# Patient Record
Sex: FEMALE | Race: WHITE | Employment: FULL TIME | URBAN - METROPOLITAN AREA
[De-identification: names, ages, dates, MRNs, and addresses within clinical notes are randomized per-mention and may not be internally consistent; named-entity substitution may affect disease eponyms.]

---

## 2024-06-11 DIAGNOSIS — Z96.651 HX OF TOTAL KNEE ARTHROPLASTY, RIGHT: ICD-10-CM

## 2024-06-11 DIAGNOSIS — M17.11 PRIMARY OSTEOARTHRITIS OF RIGHT KNEE: ICD-10-CM

## 2024-06-12 DIAGNOSIS — M17.11 PRIMARY LOCALIZED OSTEOARTHRITIS OF RIGHT KNEE: ICD-10-CM

## 2024-06-12 DIAGNOSIS — M17.11 PRIMARY OSTEOARTHRITIS OF RIGHT KNEE: ICD-10-CM

## 2024-06-13 RX ORDER — CHLORHEXIDINE GLUCONATE ORAL RINSE 1.2 MG/ML
SOLUTION DENTAL
Qty: 120 ML | Refills: 0 | Status: SHIPPED | OUTPATIENT
Start: 2024-06-13

## 2024-07-05 ENCOUNTER — CLINICAL SUPPORT (OUTPATIENT)
Dept: PREADMISSION TESTING | Facility: HOSPITAL | Age: 64
End: 2024-07-05
Payer: COMMERCIAL

## 2024-07-05 DIAGNOSIS — M17.11 PRIMARY OSTEOARTHRITIS OF RIGHT KNEE: ICD-10-CM

## 2024-07-05 RX ORDER — AMITRIPTYLINE HYDROCHLORIDE 25 MG/1
1 TABLET, FILM COATED ORAL NIGHTLY
COMMUNITY
Start: 2024-06-25

## 2024-07-05 RX ORDER — PAROXETINE HYDROCHLORIDE 40 MG/1
TABLET, FILM COATED ORAL
COMMUNITY
Start: 2023-10-23

## 2024-07-05 RX ORDER — NAPROXEN 250 MG/1
250 TABLET ORAL
COMMUNITY

## 2024-07-05 RX ORDER — BISMUTH SUBSALICYLATE 262 MG
1 TABLET,CHEWABLE ORAL DAILY
COMMUNITY

## 2024-07-05 RX ORDER — ESCITALOPRAM OXALATE 20 MG/1
1 TABLET ORAL DAILY
COMMUNITY
Start: 2024-06-25

## 2024-07-05 RX ORDER — NYSTATIN 100000 [USP'U]/ML
SUSPENSION ORAL
COMMUNITY
Start: 2023-11-29 | End: 2024-07-05 | Stop reason: WASHOUT

## 2024-07-05 RX ORDER — OXYCODONE AND ACETAMINOPHEN 10; 325 MG/1; MG/1
1 TABLET ORAL EVERY 6 HOURS PRN
COMMUNITY

## 2024-07-05 RX ORDER — PANTOPRAZOLE SODIUM 40 MG/1
1 TABLET, DELAYED RELEASE ORAL DAILY
COMMUNITY
Start: 2024-06-25

## 2024-07-05 RX ORDER — HYDROXYZINE PAMOATE 25 MG/1
1 CAPSULE ORAL 3 TIMES DAILY PRN
COMMUNITY
Start: 2024-06-25

## 2024-07-05 RX ORDER — TIZANIDINE 4 MG/1
TABLET ORAL
COMMUNITY
Start: 2023-10-12 | End: 2024-07-05 | Stop reason: WASHOUT

## 2024-07-05 RX ORDER — METHOCARBAMOL 750 MG/1
750 TABLET, FILM COATED ORAL 4 TIMES DAILY
COMMUNITY
Start: 2024-06-26

## 2024-07-05 RX ORDER — ROSUVASTATIN CALCIUM 10 MG/1
1 TABLET, COATED ORAL DAILY
COMMUNITY
Start: 2024-06-25

## 2024-07-05 RX ORDER — FAMOTIDINE 20 MG/1
1 TABLET, FILM COATED ORAL 2 TIMES DAILY PRN
COMMUNITY
Start: 2024-06-25

## 2024-07-05 RX ORDER — MONTELUKAST SODIUM 10 MG/1
10 TABLET ORAL
COMMUNITY
End: 2024-07-05 | Stop reason: WASHOUT

## 2024-07-05 RX ORDER — LOSARTAN POTASSIUM 100 MG/1
1 TABLET ORAL DAILY
COMMUNITY
Start: 2024-06-25

## 2024-07-05 RX ORDER — ACETAMINOPHEN 500 MG
TABLET ORAL
COMMUNITY
End: 2024-07-05 | Stop reason: WASHOUT

## 2024-07-05 RX ORDER — IBUPROFEN 200 MG
400 TABLET ORAL EVERY 6 HOURS PRN
COMMUNITY

## 2024-07-17 ENCOUNTER — HOSPITAL ENCOUNTER (OUTPATIENT)
Dept: RADIOLOGY | Facility: CLINIC | Age: 64
Discharge: HOME | End: 2024-07-17
Payer: COMMERCIAL

## 2024-07-17 ENCOUNTER — PRE-ADMISSION TESTING (OUTPATIENT)
Dept: PREADMISSION TESTING | Facility: HOSPITAL | Age: 64
End: 2024-07-17
Payer: COMMERCIAL

## 2024-07-17 ENCOUNTER — OFFICE VISIT (OUTPATIENT)
Dept: ORTHOPEDIC SURGERY | Facility: CLINIC | Age: 64
End: 2024-07-17
Payer: COMMERCIAL

## 2024-07-17 VITALS
HEIGHT: 67 IN | OXYGEN SATURATION: 98 % | TEMPERATURE: 97.9 F | BODY MASS INDEX: 27.92 KG/M2 | WEIGHT: 177.91 LBS | HEART RATE: 72 BPM | SYSTOLIC BLOOD PRESSURE: 129 MMHG | RESPIRATION RATE: 18 BRPM | DIASTOLIC BLOOD PRESSURE: 92 MMHG

## 2024-07-17 DIAGNOSIS — M17.11 PRIMARY LOCALIZED OSTEOARTHRITIS OF RIGHT KNEE: ICD-10-CM

## 2024-07-17 DIAGNOSIS — M17.11 PRIMARY OSTEOARTHRITIS OF RIGHT KNEE: Primary | ICD-10-CM

## 2024-07-17 PROCEDURE — 77073 BONE LENGTH STUDIES: CPT | Performed by: RADIOLOGY

## 2024-07-17 PROCEDURE — 99214 OFFICE O/P EST MOD 30 MIN: CPT | Performed by: STUDENT IN AN ORGANIZED HEALTH CARE EDUCATION/TRAINING PROGRAM

## 2024-07-17 PROCEDURE — 1036F TOBACCO NON-USER: CPT | Performed by: STUDENT IN AN ORGANIZED HEALTH CARE EDUCATION/TRAINING PROGRAM

## 2024-07-17 PROCEDURE — E0143 WALKER FOLDING WHEELED W/O S: HCPCS | Performed by: STUDENT IN AN ORGANIZED HEALTH CARE EDUCATION/TRAINING PROGRAM

## 2024-07-17 PROCEDURE — 77073 BONE LENGTH STUDIES: CPT

## 2024-07-17 PROCEDURE — 73564 X-RAY EXAM KNEE 4 OR MORE: CPT | Mod: RT

## 2024-07-17 PROCEDURE — 99203 OFFICE O/P NEW LOW 30 MIN: CPT | Performed by: NURSE PRACTITIONER

## 2024-07-17 PROCEDURE — 99204 OFFICE O/P NEW MOD 45 MIN: CPT | Performed by: STUDENT IN AN ORGANIZED HEALTH CARE EDUCATION/TRAINING PROGRAM

## 2024-07-17 PROCEDURE — E0218 FLUID CIRC COLD PAD W PUMP: HCPCS | Performed by: STUDENT IN AN ORGANIZED HEALTH CARE EDUCATION/TRAINING PROGRAM

## 2024-07-17 PROCEDURE — 73564 X-RAY EXAM KNEE 4 OR MORE: CPT | Mod: RIGHT SIDE | Performed by: RADIOLOGY

## 2024-07-17 RX ORDER — CEFADROXIL 500 MG/1
500 CAPSULE ORAL 2 TIMES DAILY
Qty: 10 CAPSULE | Refills: 0 | Status: SHIPPED | OUTPATIENT
Start: 2024-07-17 | End: 2024-07-24

## 2024-07-17 RX ORDER — TRAMADOL HYDROCHLORIDE 50 MG/1
50-100 TABLET ORAL EVERY 6 HOURS PRN
Qty: 40 TABLET | Refills: 0 | Status: SHIPPED | OUTPATIENT
Start: 2024-07-17 | End: 2024-07-26

## 2024-07-17 RX ORDER — NAPROXEN SODIUM 220 MG/1
81 TABLET, FILM COATED ORAL 2 TIMES DAILY
Qty: 60 TABLET | Refills: 0 | Status: SHIPPED | OUTPATIENT
Start: 2024-07-17 | End: 2024-08-18

## 2024-07-17 RX ORDER — ACETAMINOPHEN 500 MG
1000 TABLET ORAL EVERY 8 HOURS
Qty: 60 TABLET | Refills: 1 | Status: SHIPPED | OUTPATIENT
Start: 2024-07-17 | End: 2024-08-08

## 2024-07-17 RX ORDER — ONDANSETRON 4 MG/1
4 TABLET, FILM COATED ORAL EVERY 8 HOURS PRN
Qty: 20 TABLET | Refills: 0 | Status: SHIPPED | OUTPATIENT
Start: 2024-07-17

## 2024-07-17 RX ORDER — OXYCODONE HYDROCHLORIDE 5 MG/1
10-15 TABLET ORAL EVERY 6 HOURS PRN
Qty: 40 TABLET | Refills: 0 | Status: SHIPPED | OUTPATIENT
Start: 2024-07-17 | End: 2024-07-26

## 2024-07-17 RX ORDER — SENNOSIDES 8.6 MG/1
1 TABLET ORAL DAILY
Qty: 15 TABLET | Refills: 0 | Status: SHIPPED | OUTPATIENT
Start: 2024-07-17 | End: 2024-08-03

## 2024-07-17 RX ORDER — PANTOPRAZOLE SODIUM 40 MG/1
40 TABLET, DELAYED RELEASE ORAL
Qty: 30 TABLET | Refills: 0 | Status: SHIPPED | OUTPATIENT
Start: 2024-07-17 | End: 2024-08-18

## 2024-07-17 RX ORDER — DOCUSATE SODIUM 100 MG/1
100 CAPSULE, LIQUID FILLED ORAL 2 TIMES DAILY
Qty: 30 CAPSULE | Refills: 0 | Status: SHIPPED | OUTPATIENT
Start: 2024-07-17 | End: 2024-08-03

## 2024-07-17 ASSESSMENT — ENCOUNTER SYMPTOMS
BRUISES/BLEEDS EASILY: 1
PALPITATIONS: 0
CONSTIPATION: 1
CONSTITUTIONAL NEGATIVE: 1
LIMITED RANGE OF MOTION: 1
NEUROLOGICAL NEGATIVE: 1
NECK NEGATIVE: 1
DIARRHEA: 0
DYSPNEA AT REST: 0
DYSPNEA WITH EXERTION: 0
RESPIRATORY NEGATIVE: 1
ABDOMINAL PAIN: 0

## 2024-07-17 NOTE — CPM/PAT NURSE NOTE
CPM/PAT Nurse Note      Name: Su Ortiz (Su Ortiz)  /Age: 1960/64 y.o.       Past Medical History:   Diagnosis Date    Adverse effect of anesthesia     shaking after anesthesia    Anxiety     Colitis     Depression     Endometriosis     s/p hysterectomy    GERD (gastroesophageal reflux disease)     History of recurrent UTIs     Hyperlipidemia     Hypertension     Osteoarthritis     Post laminectomy syndrome     Pre-diabetes     A1C 5.7% - 6.25.24    Pre-operative clearance     - ECG 12-LEAD - reviewed with Dr. Andre Cleared for surgery pending labs    Skin cancer     2 moles excised from back       Past Surgical History:   Procedure Laterality Date    COLONOSCOPY      HYSTERECTOMY      LUMBAR FUSION      LUMBAR LAMINECTOMY      OTHER SURGICAL HISTORY      laparoscopy, diagnostic    WRIST SURGERY Right        Patient  has no history on file for sexual activity.    Family History   Problem Relation Name Age of Onset    Heart failure Father      Stroke Father      Colon cancer Father      Breast cancer Sister      Lung cancer Sister      Blood clot Sister         Allergies   Allergen Reactions    Sulfa (Sulfonamide Antibiotics) Hives       Prior to Admission medications    Medication Sig Start Date End Date Taking? Authorizing Provider   amitriptyline (Elavil) 25 mg tablet Take 1 tablet (25 mg) by mouth once daily at bedtime. 24   Historical Provider, MD   chlorhexidine (Peridex) 0.12 % solution Gargle, swish and spit. For use the evening before surgery and the morning of surgery. Discard unused portion. 24   Randolph Dutton MD   chlorhexidine (Peridex) 0.12 % solution Gargle, swish and spit. For use the evening before surgery and the morning of surgery. Discard unused portion. 24   Randolph Dutton MD   escitalopram (Lexapro) 20 mg tablet Take 1 tablet (20 mg) by mouth once daily. 24   Historical Provider, MD   famotidine (Pepcid) 20 mg tablet Take 1 tablet (20 mg) by  mouth 2 times a day as needed. 6/25/24   Historical Provider, MD   hydrOXYzine pamoate (Vistaril) 25 mg capsule Take 1 capsule (25 mg) by mouth 3 times a day as needed. 6/25/24   Historical Provider, MD   ibuprofen 200 mg tablet Take 2 tablets (400 mg) by mouth every 6 hours if needed for mild pain (1 - 3).    Historical Provider, MD   losartan (Cozaar) 100 mg tablet Take 1 tablet (100 mg) by mouth once daily. 6/25/24   Historical Provider, MD   methocarbamol (Robaxin) 750 mg tablet Take 1 tablet (750 mg) by mouth 4 times a day. 6/26/24   Historical Provider, MD   multivitamin tablet Take 1 tablet by mouth once daily.    Historical Provider, MD   naproxen (Naprosyn) 250 mg tablet Take 1 tablet (250 mg) by mouth 2 times daily (morning and late afternoon).    Historical Provider, MD   oxyCODONE-acetaminophen (Percocet)  mg tablet Take 1 tablet by mouth every 6 hours if needed for severe pain (7 - 10).    Historical Provider, MD   pantoprazole (ProtoNix) 40 mg EC tablet Take 1 tablet (40 mg) by mouth once daily. 6/25/24   Historical Provider, MD   PARoxetine (Paxil) 40 mg tablet  10/23/23   Historical Provider, MD   rosuvastatin (Crestor) 10 mg tablet Take 1 tablet (10 mg) by mouth once daily. 6/25/24   Historical Provider, MD JOHNSON ROS     DASI Risk Score    No data to display       Caprini DVT Assessment    No data to display       Modified Frailty Index    No data to display       CHADS2 Stroke Risk  Current as of 2 minutes ago        N/A 3 to 100%: High Risk   2 to < 3%: Medium Risk   0 to < 2%: Low Risk     Last Change: N/A          This score determines the patient's risk of having a stroke if the patient has atrial fibrillation.        This score is not applicable to this patient. Components are not calculated.          Revised Cardiac Risk Index    No data to display       Apfel Simplified Score    No data to display       Risk Analysis Index Results This Encounter    No data found in the last 1  encounters.         Nurse Plan of Action:       After Visit Summary (AVS) reviewed and patient verbalized good understanding of medications and NPO instructions.

## 2024-07-17 NOTE — PREPROCEDURE INSTRUCTIONS
Medication List            Accurate as of July 17, 2024  9:30 AM. Always use your most recent med list.                amitriptyline 25 mg tablet  Commonly known as: Elavil  Medication Adjustments for Surgery: Take morning of surgery with sip of water, no other fluids     * chlorhexidine 0.12 % solution  Commonly known as: Peridex  Gargle, swish and spit. For use the evening before surgery and the morning of surgery. Discard unused portion.     * chlorhexidine 0.12 % solution  Commonly known as: Peridex  Gargle, swish and spit. For use the evening before surgery and the morning of surgery. Discard unused portion.     escitalopram 20 mg tablet  Commonly known as: Lexapro  Medication Adjustments for Surgery: Take morning of surgery with sip of water, no other fluids     famotidine 20 mg tablet  Commonly known as: Pepcid  Medication Adjustments for Surgery: Take morning of surgery with sip of water, no other fluids     hydrOXYzine pamoate 25 mg capsule  Commonly known as: Vistaril  Medication Adjustments for Surgery: Take morning of surgery with sip of water, no other fluids     ibuprofen 200 mg tablet  Medication Adjustments for Surgery: Stop 7 days before surgery     losartan 100 mg tablet  Commonly known as: Cozaar  Notes to patient: Hold any evening dose the night before the day of surgery. Hold the day of surgery.      methocarbamol 750 mg tablet  Commonly known as: Robaxin  Medication Adjustments for Surgery: Take morning of surgery with sip of water, no other fluids     multivitamin tablet  Medication Adjustments for Surgery: Stop 7 days before surgery     naproxen 250 mg tablet  Commonly known as: Naprosyn  Medication Adjustments for Surgery: Stop 7 days before surgery     oxyCODONE-acetaminophen  mg tablet  Commonly known as: Percocet  Notes to patient: May take on the day of surgery if needed     pantoprazole 40 mg EC tablet  Commonly known as: ProtoNix  Medication Adjustments for Surgery: Take  morning of surgery with sip of water, no other fluids     PARoxetine 40 mg tablet  Commonly known as: Paxil  Medication Adjustments for Surgery: Take morning of surgery with sip of water, no other fluids     rosuvastatin 10 mg tablet  Commonly known as: Crestor  Medication Adjustments for Surgery: Take morning of surgery with sip of water, no other fluids           * This list has 2 medication(s) that are the same as other medications prescribed for you. Read the directions carefully, and ask your doctor or other care provider to review them with you.                              **Concerning above medication instructions, if medication is normally taken at night, continue normal schedule.**  **DO NOT TAKE NIGHT PRIOR AND MORNING OF SURGERY**    CONTACT SURGEON'S OFFICE IF YOU DEVELOP:  * Fever = 100.4 F   * New respiratory symptoms (e.g. cough, shortness of breath, respiratory distress, sore throat)  * Recent loss of taste or smell  *Flu like symptoms such as headache, fatigue or gastrointestinal symptoms  * You develop any open sores, shingles, burning or painful urination   AND/OR:  * You no longer wish to have the surgery.  * Any other personal circumstances change that may lead to the need to cancel or defer this surgery.  *You were admitted to any hospital within one week of your planned procedure.    SMOKING:  *Quitting smoking can make a huge difference to your health and recovery from surgery.    *If you need help with quitting, call [phone]-QUIT-NOW.    THE DAY BEFORE SURGERY:  *Do not eat any food after midnight the night before surgery.   *You are permitted to drink clear liquids (i.e. water, black coffee (no milk or cream), tea, apple juice and electrolyte drinks (gatorade)) up to 13.5 ounces, up to 2 hours before your arrival time.  *You may chew gum until 2 hours before your surgery    SURGICAL TIME  *You will be contacted between 2 p.m. and 6 p.m. the business day before your surgery with your arrival  time.  *If you haven't received a call by 6pm, call [phone].  *Scheduled surgery times may change and you will be notified if this occurs-check your personal voicemail for any updates.    ON THE MORNING OF SURGERY:  *Wear comfortable, loose fitting clothing.   *Do not use moisturizers, creams, lotions or perfume.  *All jewelry and valuables should be left at home.  *Prosthetic devices such as contact lenses, hearing aids, dentures, eyelash extensions, hairpins and body piercing must be removed before surgery.    BRING WITH YOU:  *Photo ID and insurance card  *Current list of medicines and allergies  *Pacemaker/Defibrillator/Heart stent cards  *CPAP machine and mask  *Slings/splints/crutches  *Copy of your complete Advanced Directive/DHPOA-if applicable  *Neurostimulator implant remote    PARKING AND ARRIVAL:  *Check in at the Main Entrance desk and let them know you are here for surgery.  *You will be directed to the 2nd floor surgical waiting area.    AFTER OUTPATIENT SURGERY:  *A responsible adult MUST accompany you at the time of discharge and stay with you for 24 hours after your surgery.  *You may NOT drive yourself home after surgery.  *You may use a taxi or ride sharing service ([employer], [employer]) to return home ONLY if you are accompanied by a friend or family member.  *Instructions for resuming your medications will be provided by your surgeon.         Patient Information: Oral/Dental Rinse  **This is a prescription; pick it up at your preferred local pharmacy **  What is oral/dental rinse?   It is a mouthwash. It is a way of cleaning the mouth with a germ killing solution before your surgery.  The solution contains chlorhexidine, commonly known as CHG.   It is used inside the mouth to kill a bacteria known as Staphylococcus aureus.  Let your doctor know if you are allergic to Chlorhexidine.    Why do I need to use CHG oral/dental rinse?  The CHG oral/dental rinse helps to kill a bacteria in your mouth known  a Staphylococcus aureus.     This reduces the risk of infection at the surgical site.      Using your CHG oral/dental rinse  STEPS:  Use your CHG oral/dental rinse after you brush your teeth the night before (at bedtime) and the morning of your surgery.  Follow all directions on your prescription label.    Use the cap on the container to measure 15ml (fill cap to fill line)  Swish (gargle if you can) the mouthwash in your mouth for at least 30 seconds, (do not to swallow) spit out  After you use your CHG rinse, do not rinse your mouth with water, drink or eat.  Please refer to prescription label for the appropriate time to resume oral intake  Dental rinse comes in one size bottle: 473ml ~16oz.  You will have leftover    rinse, discard after this use.    What side effects might I have using the CHG oral/dental rinse?  CHG rinse will stick to plaque on the teeth.  Brush and floss just before use.  Teeth brushing will help avoid staining of plaque during use.    Who should I contact if I have questions about the CHG oral/dental rinse?  Please call [facility] [place], Preadmission Testing at [phone] if you have any questions      Home Preoperative Antibacterial Shower     What is a home preoperative antibacterial shower?  This shower is a way of cleaning the skin with a germ killing soap before surgery.  The soap contains chlorhexidine, commonly known as CHG.  CHG is a soap for your skin with germ killing ability.  Let your doctor know if you are allergic to chlorhexidine.    Why do I need to take a preoperative antibacterial shower?  Skin is not sterile.  It is best to try to make your skin as free of germs as possible before surgery.  Proper cleansing with a germ killing soap before surgery can lower the number of germs on your skin.  This helps to reduce the risk of infection at the surgical site.  Following the instructions listed below will help you prepare your skin for surgery.       How do I use the CHG skin cleanser?  Steps:  Begin using your CHG soap five days before your scheduled surgery on ________________________.    Days 1-4 Shower before bed:  Wash your face and genitals with your normal soap and rinse.    Wash and rinse your hair using the CHG soap. Rinse completely, do not condition your   Hair.          3.    Apply the CHG soap to a clean wet washcloth.  Turn the water off or move away                From the water spray to avoid premature rinsing of the CHG soap as you are applying.     4.   Lather your entire body from the neck down.  Do not use on your face or genitals.   Pay special attention to the area(s) where your incision(s) will be located unless they are on your face.  Avoid scrubbing your skin too hard.  The important point is to have the CHG soap sit on your skin for 3 minutes.    When the 3 minutes are up, turn on the water and rinse the CHG soap off your body completely.   Pat yourself dry with a clean, freshly-laundered towel.  Dress in clean, freshly laundered night clothes.    Be sure to sleep with clean, freshly laundered sheets.  Day 5:  Last shower is the morning before surgery: Follow above Instructions.    NOTE:    *Hair extensions should be removed    *Keep CHG soap out of eyes and ear canals   *DO NOT wash with regular soap on your body after you have used the CHG        soap solution  *DO NOT apply powders, lotions, or perfume.  *Deodorant may be used days 1-4, BUT NOT the day of surgery    Who should I contact if I have any questions regarding the use of CHG soap?  Call the [facility], Preadmission Testing at [phone] if you have any questions.              Patient Information: Pre-Operative Infection Prevention Measures     Why did I have my nose, under my arms and groin swabbed?  The purpose of the swab is to identify Staphylococcus aureus inside your nose or on your skin.  The swab was sent to the laboratory for  culture.  A positive swab/culture for Staphylococcus aureus is called colonization or carriage.      What is Staphylococcus aureus?  Staphylococcus aureus, also known as “staph”, is a germ found on the skin or in the nose of healthy people.  Sometimes Staphylococcus aureus can get into the body and cause an infection.  This can be minor (such as pimples, boils or other skin problems).  It might also be serious (such as blood infection, pneumonia or a surgical site infection).    What is Staphylococcus aureus colonization or carriage?  Colonization or carriage means that a person has the germ but is not sick from it.  These bacteria can be spread on the hands or when breathing or sneezing.    How is Staphylococcus aureus spread?  It is most often spread by close contact with a person or item that carries it.    What happens if my culture is positive for Staphylococcus aureus?  Your doctor/medical team will use this information to guide any antibiotic treatment which may be necessary.  Regardless of the culture results, we will clean the inside of your nose with a betadine swab just before you have your surgery.      Will I get an infection if I have Staphylococcus aureus in my nose or on my skin?  Anyone can get an infection with Staphylococcus aureus.  However, the best way to reduce your risk of infection is to follow the instructions provided to you for the use of your CHG soap and dental rinse.        Who should I contact if I have any questions?  Call the [facility], Preadmission Testing at [phone] if you have any questions.

## 2024-07-17 NOTE — PROGRESS NOTES
PRIMARY CARE PHYSICIAN: No primary care provider on file.  REFERRING PROVIDER: No referring provider defined for this encounter.       SUBJECTIVE  CHIEF COMPLAINT: No chief complaint on file.       HPI: Su Ortiz is a pleasant 64 y.o. year-old female who is seen today for evaluation of right knee pain.  The patient presents as part of our [facility].  She is at her local orthopedic surgeon who indicated her for total joint arthroplasty.  She presents today for consultation the patient has had extreme pain for several months.  She currently takes Percocet 10/325 4 times a day for low back and knee pain.  She has not had intra-articular injections but does feel that her knee pain is severe and impacting her quality of life.  Her ability perform certain activities is limited.  She does have instability but has not recently fallen.  She does use an assistive device.    REVIEW OF SYSTEMS  The patient denies any fever, chills, chest pain, shortness of breath or difficulty breathing.  Patient denies any numbness, tingling, or radicular symptoms.  Adult patient history sheet was filled out by the patient today in clinic and will be scanned into the EMR.  I personally reviewed this form which will be scanned into the EMR.  This includes Past Medical History, Past Surgical History, Medications, Allergies, Social History, Family History and 12 point review of systems.    Past Medical History:   Diagnosis Date    Adverse effect of anesthesia     shaking after anesthesia    Anxiety     Colitis     Depression     Endometriosis     s/p hysterectomy    GERD (gastroesophageal reflux disease)     History of recurrent UTIs     Hyperlipidemia     Hypertension     Osteoarthritis     Post laminectomy syndrome     Pre-diabetes     A1C 5.7% - 6.25.24    Pre-operative clearance     - ECG 12-LEAD - reviewed with Dr. Andre Cleared for surgery pending labs    Skin cancer     2 moles excised from back         Allergies   Allergen Reactions    Sulfa (Sulfonamide Antibiotics) Hives        Past Surgical History:   Procedure Laterality Date    COLONOSCOPY      HYSTERECTOMY      LUMBAR FUSION      LUMBAR LAMINECTOMY      OTHER SURGICAL HISTORY      laparoscopy, diagnostic    WRIST SURGERY Right         Family History   Problem Relation Name Age of Onset    Heart failure Father      Stroke Father      Colon cancer Father      Breast cancer Sister      Lung cancer Sister      Blood clot Sister          Social History     Socioeconomic History    Marital status:      Spouse name: Not on file    Number of children: Not on file    Years of education: Not on file    Highest education level: Not on file   Occupational History    Not on file   Tobacco Use    Smoking status: Former     Current packs/day: 0.00     Types: Cigarettes     Quit date:      Years since quittin.5    Smokeless tobacco: Never    Tobacco comments:     Quit , 1.5 PPD x 20 yrs    Substance and Sexual Activity    Alcohol use: Never    Drug use: Yes     Frequency: 7.0 times per week     Types: Marijuana     Comment: gummies    Sexual activity: Not on file   Other Topics Concern    Not on file   Social History Narrative    Not on file     Social Determinants of Health     Financial Resource Strain: Not on file   Food Insecurity: Unknown (2024)    Received from [facility]     Food Insecurities     Worried about running out of food: Not on file     Food Bought: Not on file   Transportation Needs: Unknown (2024)    Received from [facility]     Transportation     Worried about transportation: Not on file   Physical Activity: Inactive (2019)    Received from [facility]     Exercise Vital Sign     Days of Exercise per Week: 0 days     Minutes of Exercise per Session: 0 min   Stress: Stress Concern Present (2019)    Received from [facility]     [facility] [place] of Occupational [facility] -  Occupational Stress Questionnaire     Feeling of Stress : To some extent   Social Connections: Not on file   Intimate Partner Violence: Unknown (1/20/2024)    Received from [facility] [facility]     Interpersonal Safety     Feel physically or emotionally unsafe where currently live: Not on file     Harm by anyone: Not on file     Emotionally Harmed: Not on file   Housing Stability: Unknown (1/20/2024)    Received from [employer] [facility]     Housing/Utilities     Worried about losing home: Not on file     Stayed outside house: Not on file     Unable to get utilities: Not on file        CURRENT MEDICATIONS:   Current Outpatient Medications   Medication Sig Dispense Refill    amitriptyline (Elavil) 25 mg tablet Take 1 tablet (25 mg) by mouth once daily at bedtime.      chlorhexidine (Peridex) 0.12 % solution Gargle, swish and spit. For use the evening before surgery and the morning of surgery. Discard unused portion. 120 mL 0    chlorhexidine (Peridex) 0.12 % solution Gargle, swish and spit. For use the evening before surgery and the morning of surgery. Discard unused portion. 120 mL 0    escitalopram (Lexapro) 20 mg tablet Take 1 tablet (20 mg) by mouth once daily.      famotidine (Pepcid) 20 mg tablet Take 1 tablet (20 mg) by mouth 2 times a day as needed.      hydrOXYzine pamoate (Vistaril) 25 mg capsule Take 1 capsule (25 mg) by mouth 3 times a day as needed.      ibuprofen 200 mg tablet Take 2 tablets (400 mg) by mouth every 6 hours if needed for mild pain (1 - 3).      losartan (Cozaar) 100 mg tablet Take 1 tablet (100 mg) by mouth once daily.      methocarbamol (Robaxin) 750 mg tablet Take 1 tablet (750 mg) by mouth 4 times a day.      multivitamin tablet Take 1 tablet by mouth once daily.      naproxen (Naprosyn) 250 mg tablet Take 1 tablet (250 mg) by mouth 2 times daily (morning and late afternoon).      oxyCODONE-acetaminophen (Percocet)  mg tablet Take 1 tablet by mouth every 6 hours if needed for  severe pain (7 - 10).      pantoprazole (ProtoNix) 40 mg EC tablet Take 1 tablet (40 mg) by mouth once daily.      PARoxetine (Paxil) 40 mg tablet       rosuvastatin (Crestor) 10 mg tablet Take 1 tablet (10 mg) by mouth once daily.       No current facility-administered medications for this visit.        OBJECTIVE    PHYSICAL EXAM  There is no height or weight on file to calculate BMI.    General: Well-appearing female in no acute distress.  Awake, alert and oriented.  Pleasant and cooperative.  Respiratory: Non-labored breathing  Mood: Euthymic   Gait: Antalgic  Assistive Device: None     Affected Right Knee  Limb Alignment: Varus  ROM:   Stable to varus and valgus stress at full extension and 30 degrees of flexion  Skin: Intact, no abrasions or draining sinuses  Effusion: None  Quad Strength: 5/5  Hamstring Strength: 5/5  Patella Crepitus: None  Patella Grind: Negative  Tenderness: Medial and lateral joint line  Sensation: Intact to light touch distally  Motor function: Able to fire TA, EHL, G/S  Pulses: Palpable DP pulse    IMAGING:  AP / lateral/ mid-flexion/sunrise views: Independent review of right knee x-rays was performed. The findings were reviewed with the patient. There are severe degenerative changes of the right knee with varus limb alignment. There is joint space narrowing, subchondral sclerosis, and osteophyte formation. No evidence of fracture, AVN, dislocation, osteomyelitis.        ASSESSMENT & PLAN    IMPRESSION:  Su Ortiz is a 64 y.o. female with end-stage osteoarthritis of the right knee.    PLAN:  Su Ortiz for more than six months has had limited function as well as persistent and severe pain which has negatively impacted the quality of life and interfered with activities of daily living. Under my care or the care of other providers, for greater than the three months, conservative treatment including activity modification, over the counter pain medications, injections,  physical therapy and/or recommended home exercise program, have provided only minimal relief. The patient has not had an intra-articular injection in the past 3 months. The option to continue with conservative measures in lieu of arthroplasty was discussed and offered. However, given the failure of these conservative measures and the clinical and radiographic evidence of end-stage arthritis, the patient is a good candidate for an elective total knee arthroplasty. The stated potential benefits include pain relief and improved function were discussed but no guarantees were offered. Some patients may experience improved range of motion but pre-operative range of motion remains the strongest predictor of post-operative range of motion.     I talked with the patient at length about risks, limitations, benefits and alternatives to total knee replacement today. I reviewed risks and concerns including but not limited to implant wear, loosening, implant failure, infection, need for revision surgery, delayed wound healing, deep vein thrombosis, pulmonary embolism, stroke, other cardiopulmonary event, nerve or vascular injury, death and other medical and anesthetic complications of surgery. We talked about the potential for persistent pain following surgery since there are many possible causes for knee pain. We talked about limited range of motion following knee replacement and the importance of physical therapy and their motivation. In rare cases, temporary but sometimes permanent nerve dysfunction can occur. The patient was advised that knee replacement will only relieve pain that is coming from the knee. I reviewed dislocation precautions and activity restrictions in detail. We discussed the concerns about intraoperative fracture and cemented versus cement-less implants.  We discussed the possible need for a blood transfusion. We discussed the fact that many of our patients are able to go home in 1 day or the same day  depending on their health, mobility, pre-op preparation, individual home situation and personal preference. The patient should take our pre-operative teaching class. All of the patients questions were answered. The patient can call my office to schedule surgery and the pre-op teaching class. I told the patient that they should contact their primary care physician to discuss fitness for surgery. The patient was also encouraged to get dental clearance prior to surgery.     The patient acknowledged a clear understanding of these issues and expressed the desire to proceed with surgery once medical clearance has been obtained.    The patient has identified their personal goals of their joint replacement surgery and recovery and we have discussed them. These are documented in our [employer] patient engagement platform. In addition, we have discussed the advantages and disadvantages of various implant and fixation options, as well as various surgical approaches. The basic concepts of the joint replacement procedure has been reviewed with the patient and the patient has been provided the opportunity to see an actual implant either in the office or in our pre-op education class.    I also discussed with the patient the risks of being in the hospital environment in the setting of COVID-19. This risk was considered in light of the overall risk and benefit discussion related to the surgery. The patient's symptoms are severe and worsening, and cause an inability to perform activities of daily living. All possible precautions will be taken and length of stay will be limited as much as possible. The patient is fully aware of this after complete discussion and would like to proceed.    The patient has the following comorbidities that increase the risk of infection following joint replacement surgery: Hx of recurrent UTI, Pre-DM, pre-operative opioid use. This was explicitly discussed with the patient and they would like to proceed  with surgery.     Surgical plan: Right total knee arthroplasty   Implants: [place]   Special equipment: None  DVT prophylaxis:  Aspirin 81 mg BID for 4 weeks   Drugs to stop: none  Allergies to antibiotics: none  Antibiotic Plan: Ancef (+/- vancomycin pending MRSA screen)  Pain medication post op: The patient currently takes Percocet 10/325 4 times a day.  This will make it difficult to control her pain postoperatively.  We will start with oxycodone 10 to 15 mg 4 times daily alternating with tramadol.  If this is insufficient, we will increase to oral Dilaudid.  Patient understands that we will have to work on optimizing her pain control.  DME Recommendations: [employer], Thigh high compression stocking, Walker or Crutches depending on patient preference     *This office note was dictated using Dragon voice to text software and was not proofread for spelling or grammatical errors *

## 2024-07-17 NOTE — H&P (VIEW-ONLY)
[facility]/PAT Evaluation       Name: Su Ortiz (Su Ortiz)  /Age: 1960/64 y.o.         Date of Consult: 24    Referring Provider: Dr. Braden Cummings    Surgery, Date, and Length:  RYLIE Right Knee Total Arthroplasty - Right, 24, 180 minutes.     Su Ortiz is a 64 year-old female who presents to the [facility] for perioperative risk assessment prior to surgery.    Patient has been having right knee pain since 2023. She had an injection in January but states it did not do anything. Pain is on the back of the knee as well as on top of kneecap.   She experiences pain with activity and at rest.    Patient presents with a primary diagnosis of Primary osteoarthritis of right knee.     This note was created in part upon personal review of patient's medical records.      Patient is scheduled to have a Right Knee Total Arthroplasty - Right.     +PONV  Shakiness after anesthesia    Pt denies any past history of anesthetic complications such as awareness, prolonged sedation, dental damage, aspiration, cardiac arrest, difficult intubation, difficult I.V. access or unexpected hospital admissions.  NO malignant hyperthermia and or pseudocholinesterase deficiency.  + history of blood transfusions ()    The patient is not a Sikh and will accept blood and blood products if medically indicated.     Past Medical History:   Diagnosis Date    Adverse effect of anesthesia     shaking after anesthesia    Anxiety     Colitis     Depression     Endometriosis     s/p hysterectomy    GERD (gastroesophageal reflux disease)     History of recurrent UTIs     Hyperlipidemia     Hypertension     Osteoarthritis     Post laminectomy syndrome     Pre-diabetes     A1C 5.7% - 6.25.24    Pre-operative clearance     - ECG 12-LEAD - reviewed with Dr. Andre Cleared for surgery pending labs    Skin cancer     2 moles excised from back       Past Surgical History:   Procedure Laterality Date    COLONOSCOPY       HYSTERECTOMY      LUMBAR FUSION      LUMBAR LAMINECTOMY      OTHER SURGICAL HISTORY      laparoscopy, diagnostic    WRIST SURGERY Right          Family History   Problem Relation Name Age of Onset    Heart failure Father      Stroke Father      Colon cancer Father      Breast cancer Sister      Lung cancer Sister      Blood clot Sister       Social History     Socioeconomic History    Marital status:    Tobacco Use    Smoking status: Former     Current packs/day: 0.00     Types: Cigarettes     Quit date:      Years since quittin.5    Smokeless tobacco: Never    Tobacco comments:     Quit , 1.5 PPD x 20 yrs    Substance and Sexual Activity    Alcohol use: Never    Drug use: Yes     Frequency: 7.0 times per week     Types: Marijuana     Comment: gummies     Social Determinants of Health      Received from [employer]     Food Insecurities    Received from [employer]     Transportation   Physical Activity: Inactive (2019)    Received from [employer]     Exercise Vital Sign     Days of Exercise per Week: 0 days     Minutes of Exercise per Session: 0 min   Stress: Stress Concern Present (2019)    Received from [employer]     [facility] [place] of Occupational Health - Occupational Stress Questionnaire     Feeling of Stress : To some extent    Received from [employer]     Interpersonal Safety    Received from [employer]     Housing/Utilities        Allergies   Allergen Reactions    Sulfa (Sulfonamide Antibiotics) Hives         Current Outpatient Medications:     amitriptyline (Elavil) 25 mg tablet, Take 1 tablet (25 mg) by mouth once daily at bedtime., Disp: , Rfl:     escitalopram (Lexapro) 20 mg tablet, Take 1 tablet (20 mg) by mouth once daily., Disp: , Rfl:     famotidine (Pepcid) 20 mg tablet, Take 1 tablet (20 mg) by mouth 2 times a day as needed., Disp: , Rfl:     hydrOXYzine pamoate (Vistaril) 25 mg capsule, Take 1  capsule (25 mg) by mouth 3 times a day as needed., Disp: , Rfl:     ibuprofen 200 mg tablet, Take 2 tablets (400 mg) by mouth every 6 hours if needed for mild pain (1 - 3)., Disp: , Rfl:     losartan (Cozaar) 100 mg tablet, Take 1 tablet (100 mg) by mouth once daily., Disp: , Rfl:     methocarbamol (Robaxin) 750 mg tablet, Take 1 tablet (750 mg) by mouth 4 times a day., Disp: , Rfl:     multivitamin tablet, Take 1 tablet by mouth once daily., Disp: , Rfl:     naproxen (Naprosyn) 250 mg tablet, Take 1 tablet (250 mg) by mouth 2 times daily (morning and late afternoon)., Disp: , Rfl:     oxyCODONE-acetaminophen (Percocet)  mg tablet, Take 1 tablet by mouth every 6 hours if needed for severe pain (7 - 10)., Disp: , Rfl:     pantoprazole (ProtoNix) 40 mg EC tablet, Take 1 tablet (40 mg) by mouth once daily., Disp: , Rfl:     PARoxetine (Paxil) 40 mg tablet, , Disp: , Rfl:     rosuvastatin (Crestor) 10 mg tablet, Take 1 tablet (10 mg) by mouth once daily., Disp: , Rfl:     chlorhexidine (Peridex) 0.12 % solution, Gargle, swish and spit. For use the evening before surgery and the morning of surgery. Discard unused portion., Disp: 120 mL, Rfl: 0    chlorhexidine (Peridex) 0.12 % solution, Gargle, swish and spit. For use the evening before surgery and the morning of surgery. Discard unused portion., Disp: 120 mL, Rfl: 0      PAT ROS:   Constitutional:   neg    Neuro/Psych:   neg    Eyes:    use of corrective lenses  Ears:    no hearing aides  Nose:   Mouth:    Top dentures  Throat:   neg    Neck:   neg    Cardio:    Functional 4 Mets. Patient denies SOB walking up 2 flights of stairs    no chest pain   no palpitations   no dyspnea   no CARMONA  Respiratory:   neg    Endocrine:   GI:    no abdominal pain   constipation (intermittent)   no diarrhea  :   neg    Musculoskeletal:    10/2023-knee pain on the right.  Sharp pain 9/10 -activity  6/10 at rest   decreased ROM (uses cane)  Hematologic:    bruises/bleeds easily    "history of blood transfusion   no blood clots  Skin:  neg        Physical Exam  Physical exam within normal limits.   Musculoskeletal:      Comments: Limited rom of right knee.  Right leg swelling noted.           PAT AIRWAY:   Airway:     Mallampati::  I    Neck ROM::  Full   upper dentures      Visit Vitals  BP (!) 129/92   Pulse 72   Temp 36.6 °C (97.9 °F)   Resp 18   Ht 1.702 m (5' 7\")   Wt 80.7 kg (177 lb 14.6 oz)   SpO2 98%   BMI 27.86 kg/m²   Smoking Status Former   BSA 1.95 m²     BP reviewed, Patient states this is typically how her blood pressure runs. Patient states she usually takes her blood pressure medications in the evening.     Plan    Cardiovascular:  Patient denies any chest pain, tightness, heaviness, pressure, radiating pain, palpitations, irregular heartbeats, lightheadedness, cough, congestion, shortness of breath, CARMONA, PND, near syncope, weight loss or gain.    HLD:  Crestor-patient to take on the dos    HTN:  Losartan-patient to hold any evening dose the night before the day of surgery. Patient to hold the day of surgery.       EKG 06/25/24  Normal Sinu Rhythm @ 75 bpm     RCRI: 0 Risk of Mace:3.9%      Pulm:  Denies any shortness of breath or activity intolerance.    Stop Bang= 2 (age, htn) low risk    GI/:  GERD-Pepcid-patient to take on dos  Protonix-patient to take on dos    Heme:  Patient instructed to ambulate as soon as possible postoperatively to decrease thromboembolic risk.    Initiate mechanical DVT prophylaxis as soon as possible and initiate chemical prophylaxis when deemed safe from a bleeding standpoint post surgery.    Caprini=8    Risk assessment complete.  Patient is scheduled for an intermediate surgical risk procedure.      Labs/testing 06/25/24  Cbc  WBC 7.3  Hgb-13.9  Hematocrit- 42  Plt-208    BMP  Na-140  K-4.2  Cl-104  Cr 0.62  Ca-9.3  eGFR-100    [id number]-3.7    Preoperative medication instructions were provided and reviewed with the patient.  Any additional testing " or evaluation was explained to the patient.  Nothing by mouth instructions were discussed and patient's questions were answered prior to conclusion to this encounter.  Patient verbalized understanding of preoperative instructions given in preadmission testing; discharge instructions available in EMR.    This note was dictated with speech recognition.  Minor errors may have been detected during use of speech recognition.

## 2024-07-17 NOTE — DISCHARGE INSTRUCTIONS
Randolph Dutton MD MS  [address] [facility]   [address]  [phone] (office)  [phone] (fax)    PLEASE READ CAREFULLY BEFORE CONTACTING YOUR PROVIDER.    WE WORK COLLABORATIVELY AS A TEAM. CALLING MULTIPLE STAFF MEMBERS REGARDING THE SAME ISSUE WILL DELAY YOUR CARE.  [employer] IS THE PREFERRED COMMUNICATION FOR ALL TEAM MEMBERS.    Postoperative Instructions: TOTAL KNEE ARTHROPLASTY    JOINT CARE TEAM  Please use the information below to contact your care team following surgery.  If you are leaving a message, please include your full name, date of birth and date of surgery so that we can correctly identify you.  Your call will be returned within 1-2 business days, please do not leave multiple messages regarding a single issue while you are awaiting a return call.     Who to call Contact Information Matters needing handled   Randolph Dutton MD [employer] [facility]  [phone] Prescription Refills   Orders for dental antibiotics lifelong     Priya Cochran MBA, BSN, RN-BC  LORETTA Landeros, RN  Ortho Program Navigators - LORETTA Cheney, RN  Ortho Coordinator Mikey FRASERN-BC  Ortho Nurse Navigator   [phone] Nursing, medical question related questions or concerns within 6 weeks of surgery   Orders for Outpatient Physical Therapy         [place]           [phone]     Scheduling office Visits  Medical questions/concerns  Leave of Absence or other paperwork  Any concerns more than 6 weeks from surgery - an appointment will need to be made     MEDICATION REFILLS - (MyChart or Main Office)    You will not receive a call indicating that your prescription has been filled.  Please contact your pharmacy with any questions.    Medication refills will be filled Monday-Friday 7am to 1pm ONLY. Please call the office or send a [employer] message for a refill request.  Any requests received outside of this timeframe will be handled on the next  business day.  The office staff and orthopedic nurses cannot refill medications; messages should be left directly through the office or via my chart.  Please do not call multiple times or call other members of the care team for medication needs, this will cause the refill to take longer.    Per State and Institutional policy, pain medications can only be refilled every 7 days for up to six weeks following surgery.    DRIVING & TRAVEL AFTER SURGERY   Patients should anticipate waiting at least 4-6 weeks before traveling long distances after surgery.  You will need to stop to walk around ever 1 hour during your travel to help with blood clot prevention.  Please call the office or your joint nurse to discuss prior to post-surgical travel.  Patients may not drive until cleared by the joint nurse or the office.    DENTAL PROCEDURES & CLEANINGS  You must wait a minimum of 3 months for elective dental appointments, including routine cleanings or dental work including bridges, crowns, extractions, etc..  For any dental visit - cleaning or dental procedures - patients must take an antibiotic 1 hour before the appointment.  Antibiotics are a lifelong need before dental appointments.  The antibiotic prescribed will be based on each patient's allergies.    WOUND CARE  Pain and swelling are normal following surgery and can last for weeks to months depending on the patient.  To help relieve these symptoms, please follow the post-operative pain regimen as it has been prescribed, use ice often, wear compression stockings every day as prescribed, and elevate your leg every hour.   Leaving the hospital, you have an ACE wrap in place. Two days after surgery, you can remove the ACE wrap and discard it. It does NOT need to be reapplied again.  You have a waterproof bandage on your wound and may shower with this on. The waterproof bandage is to remain in place for a 7 days. You or your home therapist should remove it at this time. You  may leave your incision open to air after the bandage has been removed.  You may shower 48 hours after surgery.  Do not scrub directly over or near the edges of your surgical bandage.  Soap and water may run freely over the site.  Under your waterproof bandage you have Steri-strips or a mesh covering in place. DO NOT peel them off. They will fall off on their own. You can continue to shower with these on. Let the water run freely over your incision when showering and do not scrub the incision or the surrounding area.   When drying the area around the incision, please use a SEPARATE towel that was not used on the rest of your body. The towel should be recently laundered but does not have to be cleaned a specific way. It should be laundered every 3 days. Pat the area dry. Do not rub the area around the incision. Steri strips will fall off in 1-2 weeks. If after 2 weeks they, have not, gently peel them off.  You may have clear stitches at the ends of your wound. Place Band-Aids on them for the first few weeks to prevent rubbing. You can gently tug on them after 2 weeks and they will come out or fall out on their own. DO NOT cut them. If they have not fallen out by you post-op visit, your doctor will remove them  If you have black stitches in place, your doctor will remove them in 2 weeks. These CANNOT get wet. If you have the silver waterproof bandage in place, you can shower. If the waterproof bandage is removed or not sticking, you CANNOT shower.  DO NOT soak your incision in a bath, hot tub, pool or pond/lake for a minimum of 12 weeks following your surgery.  DO NOT use lotions, creams, ointments on your wound for a minimum of 6 weeks following your surgery. At that time you may use vitamin E to assist with softening of your incision. Your physical therapist or doctor will talk to you about scar massage which can be started at 6 weeks.   You do NOT need to use the soap that was provided to you prior to surgery after  surgery. Use regular soap or shampoo.     PAIN, SWELLING, BRUISING & CLICKING  Pain and swelling are a natural part of your recovery which is considered normal fur up to a year after surgery.  Symptoms may be treated with movement, ice, compression stockings, elevating your leg, and by following the pain medication regimen as prescribed.  Bruising is normal for several weeks after surgery and will run down the leg over time.  You may ice areas that are tender to help with discomfort.  You are required to wear the provided compression stockings, every day, for 4 weeks following surgery.  Remove the stockings at night and place them back on in the morning.  Pain and swelling may temporarily increase with an increase in activity or exercise.  Use ice after activity.  Audible clicking with movement or exercises is considered normal following joint replacement.  You may also feel decreased sensation or numbness near the incision site.  These are usually normal and may or may not fade over time.     PERSONAL HYGIENE  You may shower upon discharging from the hospital.  Soap and water is permitted to run over the surgical dressing, steri-strips and incision.  Do not scrub directly over these items.  DO NOT soak your incision in a bath, hot tub, pool or pond/lake for a minimum of 12 weeks following your surgery.  DO NOT use lotions, creams, ointments on your wound for a minimum of 6 weeks following your surgery. At that time you may use vitamin E to assist with softening of your incision.      RESTARTING HOME ROUTINE - DIET & MEDICATIONS  Post-operative constipation can result due to a combination of inactivity, anesthesia and pain medication. To help prevent this, you should increase your water and fiber intake. Physical activity such as walking will also help stimulate the bowels.   You may resume your normal diet when you discharge home.  Choose foods that help promote good bowel habits and prevent constipation, such as  foods high in fiber.  You may restart your home medications the following day after your surgery UNLESS you have been given alternate instructions.  Follow the instructions given to you on your hospital discharge instructions for more information regarding your home medications.      IN-HOME PHYSICAL THERAPY & OUTPATIENT PHYSICAL THERAPY  Remember to get up and walk around your home every hour to 90 minutes to prevent blood clots and help with your recovery. This is an ACTIVE recovery.  In-home physical therapy will start 1-2 days after you get home from the hospital.    The home care agency will call within the first 24-48 hours to set up their first visit.  Please do not call your care team to inquire during this timeframe.  Continue the exercises you were given in the hospital until you have been seen by in-home therapy.  Make sure to provide a phone number with the ability for the home care staff to leave a message if you do not answer your phone.    Outpatient physical therapy following knee replacement surgery should begin 2-3 weeks after surgery if you and your physical therapist feel that you need it.  You should call to schedule this appointment ASAP if not already scheduled before surgery.  Waiting until you are ready for outpatient physical therapy will cause a delay in your care.  You may choose any outpatient physical therapy location.  Call the office for an order if needed.  Range of motion: Your physical therapist will work with you on regaining your range of motion after surgery. By 4 weeks after surgery, you should be able to bend to at least 100 degrees if not more.     NEVER place a pillow or blanket under your knee. When you are in bed or sitting on a couch, keep your knee COMPLETELY STRAIGHT. This is best done by placing a pillow or blanket under your calf or heel to lift your knee off of the mattress or couch.     EMERGENCIES - WHEN TO CONTACT THE SURGEON'S OFFICE IMMEDIATELY  Fever >101 with  chills that has been present for at least 48 hours.   Excessive bleeding from incision that will not slow down. A small amount of drainage is normal and expected.  Once pressure is applied and the area is covered, do not continue to check the area regularly.  This will remove pressure and bleeding will continue.  Leave in place for 4-6 hours.  Signs of infection of incision-excessive drainage that is soaking through your dressing (especially if it is pus-like), redness that is spreading out from the edges of your incision, or increased warmth around the area.  Excruciating pain for which the pain medication, taken as instructed, is not helping.  Severe calf pain.  Go directly to the emergency room or call 911, if you are experiencing chest pain or difficulty breathing.    ICE & COLD THERAPY INSTRUCTIONS    To assist with pain control and post-op swelling, you should be using ice regularly throughout recovery, especially for the first 6 weeks, regardless of the cold therapy method you use.      Always make sure there is a layer of protection between the cold pad and your skin.    If you are using ICE PACKS or GEL PACKS, you will need to alternate 20 minutes on, 20 minutes off twice per hour.    If you are using an ICE MACHINE, please follow the provided ice machine instructions.  These devices differ from ice or ice packs whereas the mechanism circulates water through tubing and a pad to provide longer periods of cold therapy to the desired site.  You can use your cold devices around the clock for optimal comfort.  We recommend using cold therapy after working with therapy or completing exercises on your own.  There is no set schedule in which you must follow while using cold therapy.  Below are a few points to remember when using a cold therapy device:    You do not need to need to use the 20 on, 20 off method.  Detach the pad from the cooler and ambulate at least once every hour.  You can check your skin under the  pad at this time.  You may wear the cold therapy device during periods of sleep including overnight.  If you wake up during the night, you can check the skin at this time.  You do not need to wake up specifically to perform skin checks.  Empty the cooler and pad when device is not in use.  Follow 's instructions for cleaning your cold therapy device.      DISCHARGE MEDICATIONS - Please reference the sample schedule for instructions on how to best schedule medications.  PAIN MEDICATION    ___X_ Tramadol / Oxycodone  Tramadol and Oxycodone have been prescribed for post-operative pain control.    These medications will only be refilled ONCE every 7 days for a period of up to 6 weeks following surgery.  After 6 weeks, you will transition to acetaminophen and over -the- counter anti-inflammatories such as Ibuprofen, Advil or Aleve in conjunction with ICE/COLD THERAPY.   Side effects may be constipation and nausea, vomiting, sleepiness, dizziness, lightheadedness, headache, blurred vision, dry mouth sweating, itching (if you have itching, over-the -counter Benadryl can be used as needed).  You may NOT operate a motor vehicle while taking these medications or have been cleared by your care team.     ___X_ Acetaminophen (Tylenol)  Acetaminophen has been prescribed as an adjunct for pain control. Take two 500 mg tablets every 8 hours for 4 weeks. You will not receive a refill on this medication.  Do not exceed 4000mg of acetaminophen within a 24 hour period.  Side effects may include nausea, heartburn, drowsiness, and headache.    _______ Meloxicam (Mobic)-Meloxicam has been prescribed as an adjunct anti-inflammatory to assist in pain control.    Take one 15mg tablet once daily for 4 weeks.  You will not receive refills on this medication.   Side effects may include nausea.  May not be prescribed if you are on a more potent blood thinner than aspirin or have chronic kidney disease.    BLOOD THINNER    ___X_ Blood  Thinner   Aspirin, eliquis or xarelto has been prescribed as a blood thinner to prevent blood clots in your leg or lungs. Take as prescribed on the bottle for 4 weeks. You will not receive a refill on this medication.  Do not take this medication if you are on another blood thinner.  Do not take any additional anti-inflammatory medications while taking Aspirin or another blood thinning medication.  Examples may include, Meloxicam, Celebrex, Ibuprofen, Motrin, Aleve, or Advil.     ANTI NAUSEA    ___X_ Pantoprazole (Protonix)  Pantoprazole has been prescribed to help with nausea and protect your stomach while taking pain medication. Take one 40 mg tablet once daily for 4 weeks. You will not receive a refill on this medication.    ___X_ Zofran (Ondansetron)  Zofran has been prescribed to help with nausea and protect your stomach while taking pain medication. Take one 4 mg tablet every eight hours as needed for nausea. Refills will be provided as necessary.      STOOL SOFTENERS    ___X_ Colace (Docusate Sodium) and Senna (Sennoside)  Post-operative constipation can result due to a combination of inactivity, anesthesia and pain medication. To help prevent this, you should increase your water and fiber intake. Physical activity such as walking will also help stimulate the bowels.   Colace has been prescribed to help with constipation while on Oxycodone and Tramadol. Take one 100 mg tablet twice daily for 4 weeks. No refills needed.  Senna has been prescribed in combination with Colace to help with constipation while taking your pain medications.  Take one 8.6mg Tablet once daily.    ANTIBIOTICS    ___X_ Cefadroxil or Doxycycline   Post-operative prevention of infection   Side effects can be upset stomach or diarrhea  Take with food. Do not take on an empty stomach  May not be prescribed       You will not receive refills on the following medications:  Acetaminophen (Tylenol)  Senna  Colace  Pantoprazole  Blood Thinner  (Aspirin or Eliquis)  Antibiotic (Cefadroxil or Doxycycline)  Pain Medication Refills - [phone] or MyChart - Monday through Friday 7am-1pm    Medication refills will be sent upon receipt of your request during the times listed above. Due to the high call volume, you will not receive a call confirming prescription refills; please do not call multiple times.  Prescription refills may take a few hours to process, you may follow up with your pharmacy for pickup availability.    SAMPLE              The times below are an example of how to organize medications to optimize pain control  Your actual medication schedule may vary based on your last dose taken IN THE HOSPITAL      Time 3:00am 6:00am 9:00am 12:00pm 3:00pm 6:00pm 9:00pm 12:00am   Medications Tramadol Acetaminophen (Tylenol)   Oxycodone  Senna   Blood Thinner  Colace  Pantoprazole  Tramadol  Antibiotic  Oxycodone Tramadol  Acetaminophen (Tylenol)  Oxycodone     Blood Thinner  Colace  Tramadol  Antibiotic  Acetaminophen (Tylenol)   Oxycodone            You may begin to wean off the pain medication as your pain remains controlled with increased activity.  The schedules provided are meant to serve as an example.  You may wean off based on your pain control.  Please note that pain medications are not filled beyond 6 weeks after surgery.              The times below are an example of how to WEAN OFF medications WHILE CONTINUING TO OPTIMIZE PAIN CONTROL.  Your actual medication schedule may vary based on your last dose taken.  Time 12:00am 4:00am 8:00am 12:00pm 4:00pm 8:00pm   Med Tramadol Oxycodone   Tramadol Oxycodone Tramadol Oxycodone     Time 12:00am 6:00am 12:00pm 6:00pm   Med Tramadol Oxycodone   Tramadol Oxycodone     Time 12:00am 8:00am 4:00pm   Med Tramadol Oxycodone   Tramadol     Time 12:00am 12:00pm   Med Tramadol Tramadol

## 2024-07-17 NOTE — CPM/PAT H&P
[facility]/PAT Evaluation       Name: Su Ortiz (Su Ortiz)  /Age: 1960/64 y.o.         Date of Consult: 24    Referring Provider: Dr. Braden Cummings    Surgery, Date, and Length:  RYLIE Right Knee Total Arthroplasty - Right, 24, 180 minutes.     Su Ortiz is a 64 year-old female who presents to the [facility] for perioperative risk assessment prior to surgery.    Patient has been having right knee pain since 2023. She had an injection in January but states it did not do anything. Pain is on the back of the knee as well as on top of kneecap.   She experiences pain with activity and at rest.    Patient presents with a primary diagnosis of Primary osteoarthritis of right knee.     This note was created in part upon personal review of patient's medical records.      Patient is scheduled to have a Right Knee Total Arthroplasty - Right.     +PONV  Shakiness after anesthesia    Pt denies any past history of anesthetic complications such as awareness, prolonged sedation, dental damage, aspiration, cardiac arrest, difficult intubation, difficult I.V. access or unexpected hospital admissions.  NO malignant hyperthermia and or pseudocholinesterase deficiency.  + history of blood transfusions ()    The patient is not a [employer] and will accept blood and blood products if medically indicated.     Past Medical History:   Diagnosis Date    Adverse effect of anesthesia     shaking after anesthesia    Anxiety     Colitis     Depression     Endometriosis     s/p hysterectomy    GERD (gastroesophageal reflux disease)     History of recurrent UTIs     Hyperlipidemia     Hypertension     Osteoarthritis     Post laminectomy syndrome     Pre-diabetes     A1C 5.7% - 6.25.24    Pre-operative clearance     - ECG 12-LEAD - reviewed with Dr. Andre Cleared for surgery pending labs    Skin cancer     2 moles excised from back       Past Surgical History:   Procedure Laterality Date    COLONOSCOPY       HYSTERECTOMY      LUMBAR FUSION      LUMBAR LAMINECTOMY      OTHER SURGICAL HISTORY      laparoscopy, diagnostic    WRIST SURGERY Right          Family History   Problem Relation Name Age of Onset    Heart failure Father      Stroke Father      Colon cancer Father      Breast cancer Sister      Lung cancer Sister      Blood clot Sister       Social History     Socioeconomic History    Marital status:    Tobacco Use    Smoking status: Former     Current packs/day: 0.00     Types: Cigarettes     Quit date:      Years since quittin.5    Smokeless tobacco: Never    Tobacco comments:     Quit , 1.5 PPD x 20 yrs    Substance and Sexual Activity    Alcohol use: Never    Drug use: Yes     Frequency: 7.0 times per week     Types: Marijuana     Comment: gummies     Social Determinants of Health      Received from [employer]     Food Insecurities    Received from [employer]     Transportation   Physical Activity: Inactive (2019)    Received from [employer]     Exercise Vital Sign     Days of Exercise per Week: 0 days     Minutes of Exercise per Session: 0 min   Stress: Stress Concern Present (2019)    Received from [employer]     [facility] Occupational Health - Occupational Stress Questionnaire     Feeling of Stress : To some extent    Received from [employer]     Interpersonal Safety    Received from [employer]     Housing/Utilities        Allergies   Allergen Reactions    Sulfa (Sulfonamide Antibiotics) Hives         Current Outpatient Medications:     amitriptyline (Elavil) 25 mg tablet, Take 1 tablet (25 mg) by mouth once daily at bedtime., Disp: , Rfl:     escitalopram (Lexapro) 20 mg tablet, Take 1 tablet (20 mg) by mouth once daily., Disp: , Rfl:     famotidine (Pepcid) 20 mg tablet, Take 1 tablet (20 mg) by mouth 2 times a day as needed., Disp: , Rfl:     hydrOXYzine pamoate (Vistaril) 25 mg capsule, Take 1  capsule (25 mg) by mouth 3 times a day as needed., Disp: , Rfl:     ibuprofen 200 mg tablet, Take 2 tablets (400 mg) by mouth every 6 hours if needed for mild pain (1 - 3)., Disp: , Rfl:     losartan (Cozaar) 100 mg tablet, Take 1 tablet (100 mg) by mouth once daily., Disp: , Rfl:     methocarbamol (Robaxin) 750 mg tablet, Take 1 tablet (750 mg) by mouth 4 times a day., Disp: , Rfl:     multivitamin tablet, Take 1 tablet by mouth once daily., Disp: , Rfl:     naproxen (Naprosyn) 250 mg tablet, Take 1 tablet (250 mg) by mouth 2 times daily (morning and late afternoon)., Disp: , Rfl:     oxyCODONE-acetaminophen (Percocet)  mg tablet, Take 1 tablet by mouth every 6 hours if needed for severe pain (7 - 10)., Disp: , Rfl:     pantoprazole (ProtoNix) 40 mg EC tablet, Take 1 tablet (40 mg) by mouth once daily., Disp: , Rfl:     PARoxetine (Paxil) 40 mg tablet, , Disp: , Rfl:     rosuvastatin (Crestor) 10 mg tablet, Take 1 tablet (10 mg) by mouth once daily., Disp: , Rfl:     chlorhexidine (Peridex) 0.12 % solution, Gargle, swish and spit. For use the evening before surgery and the morning of surgery. Discard unused portion., Disp: 120 mL, Rfl: 0    chlorhexidine (Peridex) 0.12 % solution, Gargle, swish and spit. For use the evening before surgery and the morning of surgery. Discard unused portion., Disp: 120 mL, Rfl: 0      PAT ROS:   Constitutional:   neg    Neuro/Psych:   neg    Eyes:    use of corrective lenses  Ears:    no hearing aides  Nose:   Mouth:    Top dentures  Throat:   neg    Neck:   neg    Cardio:    Functional 4 Mets. Patient denies SOB walking up 2 flights of stairs    no chest pain   no palpitations   no dyspnea   no CARMONA  Respiratory:   neg    Endocrine:   GI:    no abdominal pain   constipation (intermittent)   no diarrhea  :   neg    Musculoskeletal:    10/2023-knee pain on the right.  Sharp pain 9/10 -activity  6/10 at rest   decreased ROM (uses cane)  Hematologic:    bruises/bleeds easily    "history of blood transfusion   no blood clots  Skin:  neg        Physical Exam  Physical exam within normal limits.   Musculoskeletal:      Comments: Limited rom of right knee.  Right leg swelling noted.           PAT AIRWAY:   Airway:     Mallampati::  I    Neck ROM::  Full   upper dentures      Visit Vitals  BP (!) 129/92   Pulse 72   Temp 36.6 °C (97.9 °F)   Resp 18   Ht 1.702 m (5' 7\")   Wt 80.7 kg (177 lb 14.6 oz)   SpO2 98%   BMI 27.86 kg/m²   Smoking Status Former   BSA 1.95 m²     BP reviewed, Patient states this is typically how her blood pressure runs. Patient states she usually takes her blood pressure medications in the evening.     Plan    Cardiovascular:  Patient denies any chest pain, tightness, heaviness, pressure, radiating pain, palpitations, irregular heartbeats, lightheadedness, cough, congestion, shortness of breath, CARMONA, PND, near syncope, weight loss or gain.    HLD:  Crestor-patient to take on the dos    HTN:  Losartan-patient to hold any evening dose the night before the day of surgery. Patient to hold the day of surgery.       EKG 06/25/24  Normal Sinu Rhythm @ 75 bpm     RCRI: 0 Risk of Mace:3.9%      Pulm:  Denies any shortness of breath or activity intolerance.    Stop Bang= 2 (age, htn) low risk    GI/:  GERD-Pepcid-patient to take on dos  Protonix-patient to take on dos    Heme:  Patient instructed to ambulate as soon as possible postoperatively to decrease thromboembolic risk.    Initiate mechanical DVT prophylaxis as soon as possible and initiate chemical prophylaxis when deemed safe from a bleeding standpoint post surgery.    Caprini=8    Risk assessment complete.  Patient is scheduled for an intermediate surgical risk procedure.      Labs/testing 06/25/24  Cbc  WBC 7.3  Hgb-13.9  Hematocrit- 42  Plt-208    BMP  Na-140  K-4.2  Cl-104  Cr 0.62  Ca-9.3  eGFR-100    KgmO3v-0.7    Preoperative medication instructions were provided and reviewed with the patient.  Any additional testing " or evaluation was explained to the patient.  Nothing by mouth instructions were discussed and patient's questions were answered prior to conclusion to this encounter.  Patient verbalized understanding of preoperative instructions given in preadmission testing; discharge instructions available in EMR.    This note was dictated with speech recognition.  Minor errors may have been detected during use of speech recognition.

## 2024-07-18 ENCOUNTER — HOSPITAL ENCOUNTER (INPATIENT)
Facility: HOSPITAL | Age: 64
LOS: 1 days | Discharge: HOME HEALTH | End: 2024-07-19
Attending: STUDENT IN AN ORGANIZED HEALTH CARE EDUCATION/TRAINING PROGRAM | Admitting: STUDENT IN AN ORGANIZED HEALTH CARE EDUCATION/TRAINING PROGRAM
Payer: COMMERCIAL

## 2024-07-18 ENCOUNTER — ANESTHESIA (OUTPATIENT)
Dept: OPERATING ROOM | Facility: HOSPITAL | Age: 64
End: 2024-07-18
Payer: COMMERCIAL

## 2024-07-18 ENCOUNTER — APPOINTMENT (OUTPATIENT)
Dept: RADIOLOGY | Facility: HOSPITAL | Age: 64
End: 2024-07-18
Payer: COMMERCIAL

## 2024-07-18 ENCOUNTER — HOME HEALTH ADMISSION (OUTPATIENT)
Dept: HOME HEALTH SERVICES | Facility: HOME HEALTH | Age: 64
End: 2024-07-18
Payer: COMMERCIAL

## 2024-07-18 ENCOUNTER — ANESTHESIA EVENT (OUTPATIENT)
Dept: OPERATING ROOM | Facility: HOSPITAL | Age: 64
End: 2024-07-18
Payer: COMMERCIAL

## 2024-07-18 DIAGNOSIS — M17.11 PRIMARY OSTEOARTHRITIS OF RIGHT KNEE: ICD-10-CM

## 2024-07-18 DIAGNOSIS — Z96.651 S/P TOTAL KNEE ARTHROPLASTY, RIGHT: Primary | ICD-10-CM

## 2024-07-18 PROBLEM — I10 HTN (HYPERTENSION): Status: ACTIVE | Noted: 2024-07-18

## 2024-07-18 PROBLEM — F41.9 ANXIETY: Status: ACTIVE | Noted: 2024-07-18

## 2024-07-18 PROBLEM — J18.9 PNEUMONIA: Status: ACTIVE | Noted: 2024-07-18

## 2024-07-18 PROBLEM — K21.9 GASTROESOPHAGEAL REFLUX DISEASE: Status: ACTIVE | Noted: 2024-07-18

## 2024-07-18 PROBLEM — F32.A DEPRESSION: Status: ACTIVE | Noted: 2024-07-18

## 2024-07-18 PROBLEM — E78.5 HYPERLIPIDEMIA: Status: ACTIVE | Noted: 2024-07-18

## 2024-07-18 PROCEDURE — 97161 PT EVAL LOW COMPLEX 20 MIN: CPT | Mod: GP

## 2024-07-18 PROCEDURE — 27447 TOTAL KNEE ARTHROPLASTY: CPT | Performed by: STUDENT IN AN ORGANIZED HEALTH CARE EDUCATION/TRAINING PROGRAM

## 2024-07-18 PROCEDURE — C1713 ANCHOR/SCREW BN/BN,TIS/BN: HCPCS | Performed by: STUDENT IN AN ORGANIZED HEALTH CARE EDUCATION/TRAINING PROGRAM

## 2024-07-18 PROCEDURE — 2780000003 HC OR 278 NO HCPCS: Performed by: STUDENT IN AN ORGANIZED HEALTH CARE EDUCATION/TRAINING PROGRAM

## 2024-07-18 PROCEDURE — 9420000001 HC RT PATIENT EDUCATION 5 MIN

## 2024-07-18 PROCEDURE — 2500000005 HC RX 250 GENERAL PHARMACY W/O HCPCS: Performed by: ANESTHESIOLOGY

## 2024-07-18 PROCEDURE — 1100000001 HC PRIVATE ROOM DAILY

## 2024-07-18 PROCEDURE — 2500000004 HC RX 250 GENERAL PHARMACY W/ HCPCS (ALT 636 FOR OP/ED): Mod: JZ | Performed by: ANESTHESIOLOGY

## 2024-07-18 PROCEDURE — 2500000004 HC RX 250 GENERAL PHARMACY W/ HCPCS (ALT 636 FOR OP/ED)

## 2024-07-18 PROCEDURE — P9045 ALBUMIN (HUMAN), 5%, 250 ML: HCPCS | Mod: JZ | Performed by: ANESTHESIOLOGY

## 2024-07-18 PROCEDURE — 3600000005 HC OR TIME - INITIAL BASE CHARGE - PROCEDURE LEVEL FIVE: Performed by: STUDENT IN AN ORGANIZED HEALTH CARE EDUCATION/TRAINING PROGRAM

## 2024-07-18 PROCEDURE — 3700000001 HC GENERAL ANESTHESIA TIME - INITIAL BASE CHARGE: Performed by: STUDENT IN AN ORGANIZED HEALTH CARE EDUCATION/TRAINING PROGRAM

## 2024-07-18 PROCEDURE — 7100000002 HC RECOVERY ROOM TIME - EACH INCREMENTAL 1 MINUTE: Performed by: STUDENT IN AN ORGANIZED HEALTH CARE EDUCATION/TRAINING PROGRAM

## 2024-07-18 PROCEDURE — 7100000001 HC RECOVERY ROOM TIME - INITIAL BASE CHARGE: Performed by: STUDENT IN AN ORGANIZED HEALTH CARE EDUCATION/TRAINING PROGRAM

## 2024-07-18 PROCEDURE — 3600000010 HC OR TIME - EACH INCREMENTAL 1 MINUTE - PROCEDURE LEVEL FIVE: Performed by: STUDENT IN AN ORGANIZED HEALTH CARE EDUCATION/TRAINING PROGRAM

## 2024-07-18 PROCEDURE — 2500000001 HC RX 250 WO HCPCS SELF ADMINISTERED DRUGS (ALT 637 FOR MEDICARE OP): Performed by: STUDENT IN AN ORGANIZED HEALTH CARE EDUCATION/TRAINING PROGRAM

## 2024-07-18 PROCEDURE — 2500000005 HC RX 250 GENERAL PHARMACY W/O HCPCS: Performed by: ANESTHESIOLOGIST ASSISTANT

## 2024-07-18 PROCEDURE — 2500000001 HC RX 250 WO HCPCS SELF ADMINISTERED DRUGS (ALT 637 FOR MEDICARE OP)

## 2024-07-18 PROCEDURE — C1776 JOINT DEVICE (IMPLANTABLE): HCPCS | Performed by: STUDENT IN AN ORGANIZED HEALTH CARE EDUCATION/TRAINING PROGRAM

## 2024-07-18 PROCEDURE — RXMED WILLOW AMBULATORY MEDICATION CHARGE

## 2024-07-18 PROCEDURE — 2500000005 HC RX 250 GENERAL PHARMACY W/O HCPCS

## 2024-07-18 PROCEDURE — 0SRC0J9 REPLACEMENT OF RIGHT KNEE JOINT WITH SYNTHETIC SUBSTITUTE, CEMENTED, OPEN APPROACH: ICD-10-PCS | Performed by: STUDENT IN AN ORGANIZED HEALTH CARE EDUCATION/TRAINING PROGRAM

## 2024-07-18 PROCEDURE — 2720000007 HC OR 272 NO HCPCS: Performed by: STUDENT IN AN ORGANIZED HEALTH CARE EDUCATION/TRAINING PROGRAM

## 2024-07-18 PROCEDURE — 2500000004 HC RX 250 GENERAL PHARMACY W/ HCPCS (ALT 636 FOR OP/ED): Performed by: ANESTHESIOLOGIST ASSISTANT

## 2024-07-18 PROCEDURE — 73560 X-RAY EXAM OF KNEE 1 OR 2: CPT | Mod: RIGHT SIDE | Performed by: RADIOLOGY

## 2024-07-18 PROCEDURE — 2500000005 HC RX 250 GENERAL PHARMACY W/O HCPCS: Performed by: STUDENT IN AN ORGANIZED HEALTH CARE EDUCATION/TRAINING PROGRAM

## 2024-07-18 PROCEDURE — 3700000002 HC GENERAL ANESTHESIA TIME - EACH INCREMENTAL 1 MINUTE: Performed by: STUDENT IN AN ORGANIZED HEALTH CARE EDUCATION/TRAINING PROGRAM

## 2024-07-18 PROCEDURE — 2500000001 HC RX 250 WO HCPCS SELF ADMINISTERED DRUGS (ALT 637 FOR MEDICARE OP): Performed by: ANESTHESIOLOGY

## 2024-07-18 PROCEDURE — 73560 X-RAY EXAM OF KNEE 1 OR 2: CPT | Mod: RT

## 2024-07-18 PROCEDURE — 2500000002 HC RX 250 W HCPCS SELF ADMINISTERED DRUGS (ALT 637 FOR MEDICARE OP, ALT 636 FOR OP/ED)

## 2024-07-18 PROCEDURE — 2500000004 HC RX 250 GENERAL PHARMACY W/ HCPCS (ALT 636 FOR OP/ED): Performed by: STUDENT IN AN ORGANIZED HEALTH CARE EDUCATION/TRAINING PROGRAM

## 2024-07-18 PROCEDURE — 97110 THERAPEUTIC EXERCISES: CPT | Mod: GP

## 2024-07-18 DEVICE — IMPLANTABLE DEVICE: Type: IMPLANTABLE DEVICE | Site: KNEE | Status: FUNCTIONAL

## 2024-07-18 DEVICE — TIBAL BASE ATTUNE FB, SZ 5 CEM: Type: IMPLANTABLE DEVICE | Site: KNEE | Status: FUNCTIONAL

## 2024-07-18 DEVICE — CEMENT, BONE, TOBRAMYCIN, FULL DOSE: Type: IMPLANTABLE DEVICE | Site: KNEE | Status: FUNCTIONAL

## 2024-07-18 DEVICE — DOME, PATELLA, MEDIALIZED, 35MM: Type: IMPLANTABLE DEVICE | Site: KNEE | Status: FUNCTIONAL

## 2024-07-18 RX ORDER — AMITRIPTYLINE HYDROCHLORIDE 25 MG/1
25 TABLET, FILM COATED ORAL NIGHTLY
Status: DISCONTINUED | OUTPATIENT
Start: 2024-07-18 | End: 2024-07-19 | Stop reason: HOSPADM

## 2024-07-18 RX ORDER — FERROUS SULFATE 325(65) MG
65 TABLET ORAL
Status: DISCONTINUED | OUTPATIENT
Start: 2024-07-18 | End: 2024-07-19 | Stop reason: HOSPADM

## 2024-07-18 RX ORDER — TRANEXAMIC ACID 650 MG/1
1950 TABLET ORAL ONCE
Status: COMPLETED | OUTPATIENT
Start: 2024-07-18 | End: 2024-07-18

## 2024-07-18 RX ORDER — CEFAZOLIN 1 G/1
INJECTION, POWDER, FOR SOLUTION INTRAVENOUS AS NEEDED
Status: DISCONTINUED | OUTPATIENT
Start: 2024-07-18 | End: 2024-07-18

## 2024-07-18 RX ORDER — ACETAMINOPHEN 325 MG/1
650 TABLET ORAL ONCE
Status: COMPLETED | OUTPATIENT
Start: 2024-07-18 | End: 2024-07-18

## 2024-07-18 RX ORDER — SODIUM CHLORIDE, SODIUM LACTATE, POTASSIUM CHLORIDE, CALCIUM CHLORIDE 600; 310; 30; 20 MG/100ML; MG/100ML; MG/100ML; MG/100ML
100 INJECTION, SOLUTION INTRAVENOUS CONTINUOUS
Status: DISCONTINUED | OUTPATIENT
Start: 2024-07-18 | End: 2024-07-18

## 2024-07-18 RX ORDER — ONDANSETRON HYDROCHLORIDE 2 MG/ML
4 INJECTION, SOLUTION INTRAVENOUS ONCE AS NEEDED
Status: DISCONTINUED | OUTPATIENT
Start: 2024-07-18 | End: 2024-07-18 | Stop reason: HOSPADM

## 2024-07-18 RX ORDER — LIDOCAINE HYDROCHLORIDE 10 MG/ML
0.1 INJECTION, SOLUTION EPIDURAL; INFILTRATION; INTRACAUDAL; PERINEURAL ONCE
Status: DISCONTINUED | OUTPATIENT
Start: 2024-07-18 | End: 2024-07-18 | Stop reason: HOSPADM

## 2024-07-18 RX ORDER — PROPOFOL 10 MG/ML
INJECTION, EMULSION INTRAVENOUS CONTINUOUS PRN
Status: DISCONTINUED | OUTPATIENT
Start: 2024-07-18 | End: 2024-07-18

## 2024-07-18 RX ORDER — NALOXONE HYDROCHLORIDE 1 MG/ML
0.2 INJECTION INTRAMUSCULAR; INTRAVENOUS; SUBCUTANEOUS EVERY 5 MIN PRN
Status: DISCONTINUED | OUTPATIENT
Start: 2024-07-18 | End: 2024-07-19 | Stop reason: HOSPADM

## 2024-07-18 RX ORDER — CELECOXIB 200 MG/1
200 CAPSULE ORAL ONCE
Status: COMPLETED | OUTPATIENT
Start: 2024-07-18 | End: 2024-07-18

## 2024-07-18 RX ORDER — ACETAMINOPHEN 325 MG/1
975 TABLET ORAL EVERY 8 HOURS
Status: DISCONTINUED | OUTPATIENT
Start: 2024-07-18 | End: 2024-07-19 | Stop reason: HOSPADM

## 2024-07-18 RX ORDER — SODIUM CHLORIDE 0.9 G/100ML
IRRIGANT IRRIGATION AS NEEDED
Status: DISCONTINUED | OUTPATIENT
Start: 2024-07-18 | End: 2024-07-18 | Stop reason: HOSPADM

## 2024-07-18 RX ORDER — SODIUM CHLORIDE, SODIUM LACTATE, POTASSIUM CHLORIDE, CALCIUM CHLORIDE 600; 310; 30; 20 MG/100ML; MG/100ML; MG/100ML; MG/100ML
100 INJECTION, SOLUTION INTRAVENOUS CONTINUOUS
Status: DISCONTINUED | OUTPATIENT
Start: 2024-07-18 | End: 2024-07-19 | Stop reason: HOSPADM

## 2024-07-18 RX ORDER — ASPIRIN 81 MG/1
81 TABLET ORAL 2 TIMES DAILY
Status: DISCONTINUED | OUTPATIENT
Start: 2024-07-19 | End: 2024-07-19 | Stop reason: HOSPADM

## 2024-07-18 RX ORDER — ONDANSETRON HYDROCHLORIDE 2 MG/ML
4 INJECTION, SOLUTION INTRAVENOUS EVERY 8 HOURS PRN
Status: DISCONTINUED | OUTPATIENT
Start: 2024-07-18 | End: 2024-07-19 | Stop reason: HOSPADM

## 2024-07-18 RX ORDER — FENTANYL CITRATE 50 UG/ML
INJECTION, SOLUTION INTRAMUSCULAR; INTRAVENOUS CONTINUOUS PRN
Status: DISCONTINUED | OUTPATIENT
Start: 2024-07-18 | End: 2024-07-18

## 2024-07-18 RX ORDER — MIDAZOLAM HYDROCHLORIDE 1 MG/ML
INJECTION, SOLUTION INTRAMUSCULAR; INTRAVENOUS AS NEEDED
Status: DISCONTINUED | OUTPATIENT
Start: 2024-07-18 | End: 2024-07-18

## 2024-07-18 RX ORDER — OXYCODONE HYDROCHLORIDE 5 MG/1
10 TABLET ORAL EVERY 6 HOURS PRN
Status: DISCONTINUED | OUTPATIENT
Start: 2024-07-18 | End: 2024-07-19 | Stop reason: HOSPADM

## 2024-07-18 RX ORDER — ESCITALOPRAM OXALATE 20 MG/1
20 TABLET ORAL DAILY
Status: DISCONTINUED | OUTPATIENT
Start: 2024-07-18 | End: 2024-07-19 | Stop reason: HOSPADM

## 2024-07-18 RX ORDER — PROMETHAZINE HYDROCHLORIDE 25 MG/ML
25 INJECTION, SOLUTION INTRAMUSCULAR; INTRAVENOUS ONCE
Status: COMPLETED | OUTPATIENT
Start: 2024-07-18 | End: 2024-07-18

## 2024-07-18 RX ORDER — OXYCODONE HYDROCHLORIDE 5 MG/1
15 TABLET ORAL EVERY 6 HOURS PRN
Status: DISCONTINUED | OUTPATIENT
Start: 2024-07-18 | End: 2024-07-19 | Stop reason: HOSPADM

## 2024-07-18 RX ORDER — DEXMEDETOMIDINE HYDROCHLORIDE 4 UG/ML
INJECTION, SOLUTION INTRAVENOUS CONTINUOUS PRN
Status: DISCONTINUED | OUTPATIENT
Start: 2024-07-18 | End: 2024-07-18

## 2024-07-18 RX ORDER — ROSUVASTATIN CALCIUM 10 MG/1
10 TABLET, COATED ORAL DAILY
Status: DISCONTINUED | OUTPATIENT
Start: 2024-07-18 | End: 2024-07-19 | Stop reason: HOSPADM

## 2024-07-18 RX ORDER — ROPIVACAINE/EPI/CLONIDINE/KET 2.46-0.005
SYRINGE (ML) INJECTION AS NEEDED
Status: DISCONTINUED | OUTPATIENT
Start: 2024-07-18 | End: 2024-07-18 | Stop reason: HOSPADM

## 2024-07-18 RX ORDER — DIPHENHYDRAMINE HCL 12.5MG/5ML
12.5 LIQUID (ML) ORAL EVERY 6 HOURS PRN
Status: DISCONTINUED | OUTPATIENT
Start: 2024-07-18 | End: 2024-07-19 | Stop reason: HOSPADM

## 2024-07-18 RX ORDER — PAROXETINE HYDROCHLORIDE 20 MG/1
40 TABLET, FILM COATED ORAL DAILY
Status: DISCONTINUED | OUTPATIENT
Start: 2024-07-18 | End: 2024-07-19 | Stop reason: HOSPADM

## 2024-07-18 RX ORDER — SODIUM CHLORIDE, SODIUM LACTATE, POTASSIUM CHLORIDE, CALCIUM CHLORIDE 600; 310; 30; 20 MG/100ML; MG/100ML; MG/100ML; MG/100ML
100 INJECTION, SOLUTION INTRAVENOUS CONTINUOUS
Status: DISCONTINUED | OUTPATIENT
Start: 2024-07-18 | End: 2024-07-18 | Stop reason: HOSPADM

## 2024-07-18 RX ORDER — TRAMADOL HYDROCHLORIDE 50 MG/1
50 TABLET ORAL EVERY 6 HOURS
Status: DISCONTINUED | OUTPATIENT
Start: 2024-07-18 | End: 2024-07-19 | Stop reason: HOSPADM

## 2024-07-18 RX ORDER — POLYETHYLENE GLYCOL 3350 17 G/17G
17 POWDER, FOR SOLUTION ORAL DAILY
Status: DISCONTINUED | OUTPATIENT
Start: 2024-07-18 | End: 2024-07-19 | Stop reason: HOSPADM

## 2024-07-18 RX ORDER — DOCUSATE SODIUM 100 MG/1
100 CAPSULE, LIQUID FILLED ORAL 2 TIMES DAILY
Status: DISCONTINUED | OUTPATIENT
Start: 2024-07-18 | End: 2024-07-19 | Stop reason: HOSPADM

## 2024-07-18 RX ORDER — TRANEXAMIC ACID 100 MG/ML
INJECTION, SOLUTION INTRAVENOUS AS NEEDED
Status: DISCONTINUED | OUTPATIENT
Start: 2024-07-18 | End: 2024-07-18

## 2024-07-18 RX ORDER — KETOROLAC TROMETHAMINE 30 MG/ML
INJECTION, SOLUTION INTRAMUSCULAR; INTRAVENOUS AS NEEDED
Status: DISCONTINUED | OUTPATIENT
Start: 2024-07-18 | End: 2024-07-18

## 2024-07-18 RX ORDER — HYDROMORPHONE HYDROCHLORIDE 0.2 MG/ML
0.2 INJECTION INTRAMUSCULAR; INTRAVENOUS; SUBCUTANEOUS EVERY 4 HOURS PRN
Status: DISCONTINUED | OUTPATIENT
Start: 2024-07-18 | End: 2024-07-19 | Stop reason: HOSPADM

## 2024-07-18 RX ORDER — ONDANSETRON 4 MG/1
4 TABLET, ORALLY DISINTEGRATING ORAL EVERY 8 HOURS PRN
Status: DISCONTINUED | OUTPATIENT
Start: 2024-07-18 | End: 2024-07-19 | Stop reason: HOSPADM

## 2024-07-18 RX ORDER — MEPERIDINE HYDROCHLORIDE 25 MG/ML
12.5 INJECTION INTRAMUSCULAR; INTRAVENOUS; SUBCUTANEOUS EVERY 10 MIN PRN
Status: DISCONTINUED | OUTPATIENT
Start: 2024-07-18 | End: 2024-07-18 | Stop reason: HOSPADM

## 2024-07-18 RX ORDER — ALBUMIN HUMAN 50 G/1000ML
12.5 SOLUTION INTRAVENOUS ONCE
Status: COMPLETED | OUTPATIENT
Start: 2024-07-18 | End: 2024-07-18

## 2024-07-18 RX ORDER — PANTOPRAZOLE SODIUM 40 MG/1
40 TABLET, DELAYED RELEASE ORAL
Status: DISCONTINUED | OUTPATIENT
Start: 2024-07-19 | End: 2024-07-19 | Stop reason: HOSPADM

## 2024-07-18 RX ORDER — ONDANSETRON HYDROCHLORIDE 2 MG/ML
INJECTION, SOLUTION INTRAVENOUS AS NEEDED
Status: DISCONTINUED | OUTPATIENT
Start: 2024-07-18 | End: 2024-07-18

## 2024-07-18 RX ORDER — CEFAZOLIN SODIUM 2 G/100ML
2 INJECTION, SOLUTION INTRAVENOUS EVERY 6 HOURS
Status: COMPLETED | OUTPATIENT
Start: 2024-07-18 | End: 2024-07-19

## 2024-07-18 RX ORDER — OXYCODONE HYDROCHLORIDE 5 MG/1
5 TABLET ORAL EVERY 4 HOURS PRN
Status: DISCONTINUED | OUTPATIENT
Start: 2024-07-18 | End: 2024-07-18 | Stop reason: HOSPADM

## 2024-07-18 RX ORDER — KETOROLAC TROMETHAMINE 30 MG/ML
15 INJECTION, SOLUTION INTRAMUSCULAR; INTRAVENOUS EVERY 6 HOURS
Status: DISCONTINUED | OUTPATIENT
Start: 2024-07-18 | End: 2024-07-19 | Stop reason: HOSPADM

## 2024-07-18 SDOH — SOCIAL STABILITY: SOCIAL INSECURITY: DO YOU FEEL ANYONE HAS EXPLOITED OR TAKEN ADVANTAGE OF YOU FINANCIALLY OR OF YOUR PERSONAL PROPERTY?: NO

## 2024-07-18 SDOH — SOCIAL STABILITY: SOCIAL INSECURITY: DOES ANYONE TRY TO KEEP YOU FROM HAVING/CONTACTING OTHER FRIENDS OR DOING THINGS OUTSIDE YOUR HOME?: NO

## 2024-07-18 SDOH — SOCIAL STABILITY: SOCIAL INSECURITY: HAS ANYONE EVER THREATENED TO HURT YOUR FAMILY OR YOUR PETS?: NO

## 2024-07-18 SDOH — SOCIAL STABILITY: SOCIAL INSECURITY: WERE YOU ABLE TO COMPLETE ALL THE BEHAVIORAL HEALTH SCREENINGS?: YES

## 2024-07-18 SDOH — SOCIAL STABILITY: SOCIAL INSECURITY: HAVE YOU HAD THOUGHTS OF HARMING ANYONE ELSE?: NO

## 2024-07-18 SDOH — SOCIAL STABILITY: SOCIAL INSECURITY: ARE THERE ANY APPARENT SIGNS OF INJURIES/BEHAVIORS THAT COULD BE RELATED TO ABUSE/NEGLECT?: NO

## 2024-07-18 SDOH — SOCIAL STABILITY: SOCIAL INSECURITY: ABUSE: ADULT

## 2024-07-18 SDOH — SOCIAL STABILITY: SOCIAL INSECURITY: ARE YOU OR HAVE YOU BEEN THREATENED OR ABUSED PHYSICALLY, EMOTIONALLY, OR SEXUALLY BY ANYONE?: NO

## 2024-07-18 SDOH — SOCIAL STABILITY: SOCIAL INSECURITY: DO YOU FEEL UNSAFE GOING BACK TO THE PLACE WHERE YOU ARE LIVING?: NO

## 2024-07-18 SDOH — SOCIAL STABILITY: SOCIAL INSECURITY: HAVE YOU HAD ANY THOUGHTS OF HARMING ANYONE ELSE?: NO

## 2024-07-18 ASSESSMENT — COGNITIVE AND FUNCTIONAL STATUS - GENERAL
STANDING UP FROM CHAIR USING ARMS: A LITTLE
TURNING FROM BACK TO SIDE WHILE IN FLAT BAD: A LITTLE
CLIMB 3 TO 5 STEPS WITH RAILING: A LOT
MOBILITY SCORE: 18
TURNING FROM BACK TO SIDE WHILE IN FLAT BAD: A LITTLE
WALKING IN HOSPITAL ROOM: A LOT
PATIENT BASELINE BEDBOUND: NO
DAILY ACTIVITIY SCORE: 22
STANDING UP FROM CHAIR USING ARMS: A LITTLE
MOBILITY SCORE: 18
DAILY ACTIVITIY SCORE: 21
MOBILITY SCORE: 17
DRESSING REGULAR LOWER BODY CLOTHING: A LOT
CLIMB 3 TO 5 STEPS WITH RAILING: A LOT
DRESSING REGULAR LOWER BODY CLOTHING: A LOT
WALKING IN HOSPITAL ROOM: A LITTLE
WALKING IN HOSPITAL ROOM: A LITTLE
CLIMB 3 TO 5 STEPS WITH RAILING: A LOT
MOVING TO AND FROM BED TO CHAIR: A LITTLE
TURNING FROM BACK TO SIDE WHILE IN FLAT BAD: A LITTLE
HELP NEEDED FOR BATHING: A LITTLE
MOVING TO AND FROM BED TO CHAIR: A LITTLE
STANDING UP FROM CHAIR USING ARMS: A LITTLE
MOVING TO AND FROM BED TO CHAIR: A LITTLE

## 2024-07-18 ASSESSMENT — PAIN DESCRIPTION - LOCATION
LOCATION: KNEE
LOCATION: KNEE

## 2024-07-18 ASSESSMENT — PAIN - FUNCTIONAL ASSESSMENT
PAIN_FUNCTIONAL_ASSESSMENT: UNABLE TO SELF-REPORT
PAIN_FUNCTIONAL_ASSESSMENT: 0-10
PAIN_FUNCTIONAL_ASSESSMENT: UNABLE TO SELF-REPORT

## 2024-07-18 ASSESSMENT — COLUMBIA-SUICIDE SEVERITY RATING SCALE - C-SSRS
6. HAVE YOU EVER DONE ANYTHING, STARTED TO DO ANYTHING, OR PREPARED TO DO ANYTHING TO END YOUR LIFE?: NO
2. HAVE YOU ACTUALLY HAD ANY THOUGHTS OF KILLING YOURSELF?: NO
1. IN THE PAST MONTH, HAVE YOU WISHED YOU WERE DEAD OR WISHED YOU COULD GO TO SLEEP AND NOT WAKE UP?: NO

## 2024-07-18 ASSESSMENT — LIFESTYLE VARIABLES
AUDIT-C TOTAL SCORE: 0
AUDIT-C TOTAL SCORE: 0
HOW MANY STANDARD DRINKS CONTAINING ALCOHOL DO YOU HAVE ON A TYPICAL DAY: PATIENT DOES NOT DRINK
SKIP TO QUESTIONS 9-10: 1
HOW OFTEN DO YOU HAVE A DRINK CONTAINING ALCOHOL: NEVER
HOW OFTEN DO YOU HAVE 6 OR MORE DRINKS ON ONE OCCASION: NEVER

## 2024-07-18 ASSESSMENT — ACTIVITIES OF DAILY LIVING (ADL)
HEARING - LEFT EAR: FUNCTIONAL
ADL_ASSISTANCE: INDEPENDENT
FEEDING YOURSELF: INDEPENDENT
HEARING - RIGHT EAR: FUNCTIONAL
BATHING: INDEPENDENT
WALKS IN HOME: INDEPENDENT
JUDGMENT_ADEQUATE_SAFELY_COMPLETE_DAILY_ACTIVITIES: YES
DRESSING YOURSELF: INDEPENDENT
PATIENT'S MEMORY ADEQUATE TO SAFELY COMPLETE DAILY ACTIVITIES?: YES
GROOMING: INDEPENDENT
ADEQUATE_TO_COMPLETE_ADL: YES
LACK_OF_TRANSPORTATION: NO
TOILETING: INDEPENDENT

## 2024-07-18 ASSESSMENT — PAIN SCALES - WONG BAKER: WONGBAKER_NUMERICALRESPONSE: HURTS WHOLE LOT

## 2024-07-18 ASSESSMENT — PAIN SCALES - GENERAL
PAINLEVEL_OUTOF10: 7
PAINLEVEL_OUTOF10: 8
PAINLEVEL_OUTOF10: 8
PAINLEVEL_OUTOF10: 7
PAINLEVEL_OUTOF10: 9
PAINLEVEL_OUTOF10: 8
PAINLEVEL_OUTOF10: 8
PAINLEVEL_OUTOF10: 9
PAIN_LEVEL: 0
PAINLEVEL_OUTOF10: 8
PAINLEVEL_OUTOF10: 9
PAINLEVEL_OUTOF10: 3
PAINLEVEL_OUTOF10: 8
PAINLEVEL_OUTOF10: 9
PAINLEVEL_OUTOF10: 8
PAINLEVEL_OUTOF10: 9
PAINLEVEL_OUTOF10: 8

## 2024-07-18 ASSESSMENT — PATIENT HEALTH QUESTIONNAIRE - PHQ9
2. FEELING DOWN, DEPRESSED OR HOPELESS: NOT AT ALL
1. LITTLE INTEREST OR PLEASURE IN DOING THINGS: NOT AT ALL
SUM OF ALL RESPONSES TO PHQ9 QUESTIONS 1 & 2: 0

## 2024-07-18 NOTE — SIGNIFICANT EVENT
Patient arrived to [facility] after procedure with Anesthesia and procedure RN, procedure discussed, plan reviewed, VSS

## 2024-07-18 NOTE — ANESTHESIA POSTPROCEDURE EVALUATION
Patient: Su Ortiz    Procedure Summary       Date: 07/18/24 Room / Location: [facility] A OR 12 / [facility] A OR    Anesthesia Start: 0725 Anesthesia Stop: 0956    Procedure: Right Knee Total Arthroplasty (Right: Knee) Diagnosis:       Primary osteoarthritis of right knee      (Primary osteoarthritis of right knee [M17.11])    Surgeons: Randolph Dutton MD Responsible Provider: Robb De Luna MD    Anesthesia Type: regional, spinal ASA Status: 3            Anesthesia Type: regional, spinal    Vitals Value Taken Time   BP 81/58 07/18/24 1117   Temp 36 °C (96.8 °F) 07/18/24 1100   Pulse 72 07/18/24 1128   Resp 13 07/18/24 1128   SpO2 98 % 07/18/24 1128   Vitals shown include unfiled device data.    Anesthesia Post Evaluation    Patient location during evaluation: bedside  Patient participation: complete - patient cannot participate  Level of consciousness: awake  Pain score: 0  Pain management: adequate  Airway patency: patent  Cardiovascular status: acceptable  Respiratory status: acceptable  Hydration status: acceptable  Postoperative Nausea and Vomiting: none      Hydrating in PACU with 5% albumin.  No notable events documented.

## 2024-07-18 NOTE — ANESTHESIA PROCEDURE NOTES
Spinal Block    Patient location during procedure: OR  Start time: 7/18/2024 7:35 AM  End time: 7/18/2024 7:40 AM  Reason for block: primary anesthetic and at surgeon's request  Staffing  Performed: attending   Authorized by: Robb De Luna MD    Performed by: ANTHONY Maldonado    Preanesthetic Checklist  Completed: patient identified, IV checked, risks and benefits discussed, surgical consent, pre-op evaluation, timeout performed and sterile techniques followed  Block Timeout  RN/Licensed healthcare professional reads aloud to the Anesthesia provider and entire team: Patient identity, procedure with side and site, patient position, and as applicable the availability of implants/special equipment/special requirements.  Patient on coagulant treatment: no  Timeout performed at:   Spinal Block  Patient position: sitting  Prep: Betadine  Sterility prep: gloves and drape  Sedation level: moderate sedation  Patient monitoring: continuous pulse oximetry and blood pressure  Approach: midline  Vertebral space: L1-2  Injection technique: single-shot  Needle  Needle type: short-bevel   Needle gauge: 24 G  Needle length: 4 in  Free flowing CSF: yes    Assessment  Sensory level: T10 bilateral  Block outcome: patient comfortable  Procedure assessment: patient sedated but conversant throughout procedure and patient tolerated procedure well with no immediate complications

## 2024-07-18 NOTE — PROGRESS NOTES
"Orthopaedic Surgery Progress Note  Subjective    S:    Evaluated Post-operatively on the floor. Pain controlled on current regimen.  Spinal .  Denies any new onset numbness, tingling or weakness.  Worked with PT.        Objective    O:  /71 (BP Location: Right arm, Patient Position: Lying)   Pulse 89   Temp 36.3 °C (97.3 °F) (Temporal)   Resp 16   Ht 1.702 m (5' 7\")   Wt 80.3 kg (177 lb)   SpO2 93%   BMI 27.72 kg/m²     GEN - NAD, resting comfortably in hospital bed  HEENT - MMM, EOMI  CV - RRR by peripheral palpation, limbs wwp  PULM - NWOB on RA  ABD - Non-distended  NEURO - VIDES spontaneously  PSYCH - Appropriate mood and affect    MSK:  -Right lower extremity  -Iceman compressive dressing in place.  Clean and dry without strikethrough.  - SILT s/s/sp/dp/t  - fires PF/DF/EHL  - Toes WWP, 1+ DP pulses          Assessment   A/P: 64 y.o. female status post right total knee arthroplasty with Dr. Dutton on .      Plan:  - Clear liquid diet, okay to advance as tolerated. Bowel Regimen: Colace, senna, dulcolax.  - Multimodal pain therapy: scheduled tylenol, prn oxycodone, dilaudid prn for breakthrough  - mIVF to continue until patient is tolerating good PO intake, HLIV w/ good PO; Will monitor BMP on POD 1 and prn thereafter  - Weightbearing: As tolerated.   - PT/OT consult, appreciate recommendations.    - Encourage IS   - Perioperative ancef q8 for 24 hours  - No indication for transfusion; monitor CBC POD1, repeat prn thereafter.  - DVT PPx: SCD,  ASA 81 mg BID for chemoPPx   - Continue home meds:       Dispo: Pending PT pending AM PT.  Anticipate discharge tomorrow.    This plan was discussed with the attending, Dr. Dutton.    Daniel Lee, DO   PGY-IV  Orthopaedic Surgery   Pager: [phone]  Epic Chat Preferred     While admitted, this patient will be followed by the Joints Team. Please reach out to orthopaedic on-call MABLE first with any questions or concerns. Please contact below " residents with any questions (available via Epic Chat).     Joints Team: Bk Plummer, PGY1; Lo Eller, PGY2; Daniel Lee, PGY4    Sports Team: Ijeoma Quevedo, PGY3; Annette Lovelace, PGY5    Community Team: Grady Gutierrez, PGY3;  Jay Garsia, PGY5    Shoulder/Elbow Team:  Glenn Pinedo, PGY4    Foot/Ankle Team: Esvin Ribeiro, PGY3    On weekends and after 6PM:  At [facility]: Please reach out to the orthopaedic on-call resident ([phone])  At [facility]: Please reach out to the orthopaedic on-call MABLE or resident (please refer to Qgenda)

## 2024-07-18 NOTE — ANESTHESIA PREPROCEDURE EVALUATION
Patient: Su Ortiz    Procedure Information       Date/Time: 07/18/24 0730    Procedure: Right Knee Total Arthroplasty (Right: Knee)    Location: [facility] A OR 12 / [facility] A OR    Surgeons: Randolph Dutton MD            Relevant Problems   Anesthesia (within normal limits)      Cardiac   (+) HTN (hypertension)   (+) Hyperlipidemia      Pulmonary  Remote bronchitis  Remote smoking   (+) Pneumonia (remote)      Neuro   (+) Anxiety   (+) Depression      GI   (+) Gastroesophageal reflux disease      /Renal (within normal limits)      Liver (within normal limits)      Hematology (within normal limits)      Musculoskeletal   (+) Primary osteoarthritis of right knee       Clinical information reviewed:   Tobacco  Allergies  Meds   Med Hx  Surg Hx  OB Status  Fam Hx  Soc   Hx        NPO Detail:  NPO/Void Status  Date of Last Liquid: 07/18/24  Time of Last Liquid: 0530  Date of Last Solid: 07/17/24  Time of Last Solid: 2315         Physical Exam    Airway  Mallampati: II     Cardiovascular    Dental   (+) upper dentures  Comments: Lower edentulous   Pulmonary    Abdominal            Anesthesia Plan    History of general anesthesia?: yes  History of complications of general anesthesia?: no    ASA 3     regional and spinal   (Pt has had prior spinal fusion.  Will attempt spinal, give GA if unable to access CSF.)  intravenous induction   Anesthetic plan and risks discussed with patient.

## 2024-07-18 NOTE — ANESTHESIA PROCEDURE NOTES
Peripheral Block    Patient location during procedure: pre-op  Start time: 7/18/2024 7:00 AM  End time: 7/18/2024 7:13 AM  Reason for block: procedure for pain, at surgeon's request and post-op pain management  Staffing  Performed: attending   Authorized by: Robb De Luna MD    Performed by: Robb De Luna MD  Preanesthetic Checklist  Completed: patient identified, IV checked, site marked, risks and benefits discussed, surgical consent, monitors and equipment checked, pre-op evaluation and timeout performed   Timeout performed at: 7/18/2024 7:00 AM  Peripheral Block  Patient position: laying flat  Prep: ChloraPrep  Patient monitoring: heart rate and continuous pulse ox  Block type: adductor canal  Laterality: right  Injection technique: single-shot  Guidance: ultrasound guided  Local infiltration: lidocaine  Needle  Needle type: short-bevel   Needle gauge: 22 G  Needle length: 8 cm  Needle localization: ultrasound guidance     image stored in chart  Test dose: negative  Assessment  Injection assessment: negative aspiration for heme, no paresthesia on injection, incremental injection and local visualized surrounding nerve on ultrasound  Paresthesia pain: none  Heart rate change: no  Slow fractionated injection: yes  Additional Notes  Bupivacaine 0.375% 30ml  + decadron 4mg + used for block.  Versed 2mg iv given.  Pt becky proc well.

## 2024-07-18 NOTE — CARE PLAN
The patient's goals for the shift include      The clinical goals for the shift include no falls      Problem: Skin  Goal: Decreased wound size/increased tissue granulation at next dressing change  Outcome: Progressing  Goal: Participates in plan/prevention/treatment measures  Outcome: Progressing  Goal: Prevent/manage excess moisture  Outcome: Progressing  Goal: Prevent/minimize sheer/friction injuries  Outcome: Progressing  Goal: Promote/optimize nutrition  Outcome: Progressing  Goal: Promote skin healing  Outcome: Progressing     Problem: Pain  Goal: Takes deep breaths with improved pain control throughout the shift  Outcome: Progressing  Goal: Turns in bed with improved pain control throughout the shift  Outcome: Progressing  Goal: Walks with improved pain control throughout the shift  Outcome: Progressing  Goal: Performs ADL's with improved pain control throughout shift  Outcome: Progressing  Goal: Participates in PT with improved pain control throughout the shift  Outcome: Progressing  Goal: Free from opioid side effects throughout the shift  Outcome: Progressing  Goal: Free from acute confusion related to pain meds throughout the shift  Outcome: Progressing

## 2024-07-18 NOTE — OP NOTE
Right Knee Total Arthroplasty (R) Operative Note     Date: 2024  OR Location: [facility] A OR    Name: Su Ortiz YOB: 1960, Age: 64 y.o., MRN: [id number], Sex: female    Diagnosis  Pre-op Diagnosis      * Primary osteoarthritis of right knee [M17.11] Post-op Diagnosis     * Primary osteoarthritis of right knee [M17.11]     Procedures  Right Knee Total Arthroplasty  70520 - DE ARTHRP KNE CONDYLE&PLATU MEDIAL&LAT COMPARTMENTS      Surgeons      * Randolph Dutton - Primary    Resident/Fellow/Other Assistant:  Surgeons and Role:     * Daniel Lee DO - Resident - Assisting    Procedure Summary  Anesthesia: Regional, Spinal  ASA: III  Anesthesia Staff: Anesthesiologist: Robb De Luna MD  C-AA: ANTHONY Maldonado  Estimated Blood Loss: 25mL  Intra-op Medications:   Administrations occurring from 0730 to 1030 on 24:   Medication Name Total Dose   ropivacaine-epinephrine-clonidine-ketorolac 2.46-0.005- 0.0008-0.3mg/mL periarticular syringe 50 mL   sodium chloride 0.9 % irrigation solution 3,000 mL              Anesthesia Record               Intraprocedure I/O Totals          Intake    Dexmedetomidine 0.00 mL    The total shown is the total volume documented since Anesthesia Start was filed.    Tranexamic Acid 0.00 mL    The total shown is the total volume documented since Anesthesia Start was filed.    Total Intake 0 mL       Output    Est. Blood Loss 25 mL    Total Output 25 mL       Net    Net Volume -25 mL          Specimen: No specimens collected     Staff:   Circulator: Mai  Scrub Person: Sarah  Relief Scrub: Sena  Relief Circulator: Terrence         Drains and/or Catheters: * None in log *    Tourniquet Times:     Total Tourniquet Time Documented:  Thigh (Right) - 97 minutes  Total: Thigh (Right) - 97 minutes      Implants:  Implants       Type Name Action Serial No.      Implant CEMENT, BONE, TOBRAMYCIN, FULL DOSE - SN/A - [id number] Implanted N/A     Joint Knee FEMORAL, ATTUNE CR,  BRIDGET, SZ 7, RT - SN/A - [id number] Implanted N/A     Joint Knee TIBAL BASE ATTUNE FB, SZ 5 BRIDGET - SN/A - [id number] Implanted N/A     Joint Knee DOME, PATELLA, MEDIALIZED, 35MM - SN/A - [id number] Implanted N/A     Joint Tibial insert fixed bearing medial stabilized Implanted N/A              Findings: DJD right knee. Fixed varus. Poor bone quality. 10 deg flexion contracture pre-op    Implants:   Depuy Attune CR Femur Size 7  Depuy Attune Fixed Bearing Tibia Size 5  Depuy Polyethylene Patella Size 35  Polyethylene insert Size 7, 5 mm Thickness       Anesthesia: Spinal      Tourniquet Time: 97 min       Medications: Ancef and TXA       Position: Supine       Blood loss: 25 cc       Complications: None       Specimen: None       INDICATIONS:   The patient is a 64 y.o. year old female with a longstanding history of knee pain secondary to end stage osteoarthritis. The patient failed non-operative treatment to include anti-inflammatories, rest, and activity modification. Radiographs showed bone-on-bone osteoarthritis.  After discussing the options, risks, benefits, and possible complications, the patient elected to undergo a total knee arthroplasty. The patient understood the risks including but are not limited to infection, fracture, loosening of the components, failure of the implants, loss of range of motion, stiffness, chronic pain, opioid addiction, disability, wear, loosening, reaction to implanted materials, DVT, PE, MI, stroke, loss of limb, loss of life, or potential need for further surgery. The patient understands these risks and has elected to proceed.       TECHNIQUE:   The patient was properly identified in the holding area using name, medical record number, and date of birth. Informed consent was then signed and the operative extremity was marked. The patient had an opportunity to ask questions which were answered. The procedure, risks, benefits and alternatives were reviewed. Next, the patient was brought  back to the operating room. Spinal anesthesia was initiated without difficulty. The patient was placed in the supine position on the table. All mia prominences were well padded. A sequential compression device was applied to the contralateral extremity. The operative lower extremity was prepped and draped in usual sterile fashion. The patient received ancef and TXA prior to incision.   A preprocedural timeout was then performed once again confirming the patient's identity, procedure, and laterality. In addition, allergy status and required equipment were reviewed. Three independent members of the operating room team agreed on the above-mentioned parameters. The pre-operative marking was still visible.       The leg was then exsanguinated, tourniquet inflated, incision made in the midline.  Soft tissue dissected down to the retinaculum was performed. A medial parapatellar arthrotomy was performed.  Partial medial synovectomy was performed. A medial release was performed.  Fat pad was resected laterally. The anterior medial and lateral meniscus were released. ACL was transected.       Using the step drill, the femoral canal was opened.  We then used bulb irrigation to irrigate the intramedullary canal.  The intramedullary thi was introduced into the femur.  The distal femoral cut angle was set at 5 degrees. Distal resection block was pinned in place taking one additional millimeter from the distal femur.  Collateral retractors were placed and the distal femur was resected.       Attention was then turned to the patella.  The patella measured 25 mm centrally. It was then cut to 15 mm. The patella was checked to ensure it was symmetric. The patella was then sized and prepared using the guide. A size 35 mm was found to be appropriate.  I placed a patella protector and flexed up the knee.       We then turned our attention to the proximal tibia. Medial, lateral and posterior retractors were placed. The extramedullary  guide was set for 7 degrees of posterior slope and neutral varus/valgus angulation.  The block was pinned in place and the proximal tibia was resected.  The cut was verified to ensure that it was flat and not in varus or valgus.       The knee was brought into extension and a medial and lateral meniscectomy were performed.       We then turned our attention back to the femur.  The sizing guide was introduced and pinned in place.  The femur was sized to a size 7.  Appropriate pins were placed.  The sizing block was then removed and the 4-in-1 cutting block was introduced and pinned in place.  The anterior, posterior and chamfer cuts were then made.  The cutting block was removed.       Attention was then turned to the posterior osteophytes. Using a curved 3/4 inch osteotome, the osteophytes were removed. We then used a cob to elevate the posterior capsule from the femur.       The tibia was subluxed and a trial base plate was used to size the tibia. A size 5 was appropriate. This was secured in place with provisional pins. The femoral trail was then impacted. A trial polyethylene was inserted. The PCL was left intact and had excellent tension.     The knee was able to be flexed to 120 degrees. The knee was stable to both varus and valgus stress through full range of motion, including mid flexion. The knee was checked again and found to be symmetric. The patella tracked well. The trial polyethylene was removed, and the femoral lug holes were drilled. The final tibial preparation was performed. All trials were removed. The knee was copiously irrigated and dried.       The cement was mixed and the final implants were cemented into place starting with the tibia. Excess cement was removed. A trial polyethylene was placed and the knee was held in full extension to allow the cement to harden.     Finally, the trial polyethylene was replaced with the definitive insert. A 5 mm thickness was appropriate. The knee was again  brought through a full range of motion. It was stable to both varus and valgus stress through a full range of motion. The patella tracked centrally. The joint mix was injected into the local soft tissues and the wound was irrigated with copious pulse lavage.       The knee was again copiously irrigated. The retinaculum was approximated with #2 ethibond and then closed with #1 Stratafix. Deep subcutaneous and dermis closed with 0 and 2-0 Vicryl in simple interrupted fashion. Skin closure with Monocryl and skin glue.       The patient tolerated the procedure well with no complications and was taken from the operating room in stable condition. All sponge and needle counts were correct at the end of the case.       I attest that I was present and scrubbed for all critical parts of the procedure.     Post Operative Plan:  Weight bearing as tolerated  Aspirin 81 mg BID for 4 weeks for VTE prophylaxis  Post operative antibiotics in PACU  Disposition: Overnight observation      Complications:  None; patient tolerated the procedure well.    Disposition: PACU - hemodynamically stable.  Condition: stable         Additional Details: NA    Attending Attestation: I was present and scrubbed for the key portions of the procedure.    Randolph Dutton  Phone Number: [phone]

## 2024-07-18 NOTE — PROGRESS NOTES
Physical Therapy    Physical Therapy Evaluation & Treatment    Patient Name: Su Ortiz  MRN: [id number]  Today's Date: 7/18/2024   Time Calculation  Start Time: 1532  Stop Time: 1601  Time Calculation (min): 29 min    Assessment/Plan   PT Assessment  PT Assessment Results: Decreased range of motion, Decreased strength, Impaired balance, Decreased endurance, Decreased mobility, Pain, Orthopedic restrictions  Rehab Prognosis: Good  Barriers to Discharge: none  End of Session Communication: Bedside nurse  Assessment Comment: Pt is POD #0, s/p R TKA & is WBAT.  The pt presents with diminished safety & independence regarding bed mobility, transfers, and gait.  The patient is also unable to ascend/descend stairs and requires cueing in regard to TKA precautions.  Contributing to these impairments are post-op pain, decreased R knee ROM & strength.  The patient would benefit from continued PT to address the above functional limitations & improve independence/safety.  Anticipate low frequency PT needs, at discharge  End of Session Patient Position: Up in chair, Alarm on   IP OR SWING BED PT PLAN  Inpatient or Swing Bed: Inpatient  PT Plan  Treatment/Interventions: Bed mobility, Transfer training, Gait training, Stair training, Balance training, Strengthening, Endurance training, Range of motion, Therapeutic exercise, Therapeutic activity, Home exercise program  PT Plan: Ongoing PT  PT Frequency: BID  PT Discharge Recommendations: Low intensity level of continued care  PT Recommended Transfer Status: Assist x1  PT - OK to Discharge: Yes (PT POC Established)      Subjective     General Visit Information:  General  Reason for Referral: R TKA  Referred By: Mame Kaur PA-C  Past Medical History Relevant to Rehab:   Past Medical History:   Diagnosis Date    Adverse effect of anesthesia     shaking after anesthesia    Anxiety     Colitis     Depression     Endometriosis     s/p hysterectomy    GERD (gastroesophageal reflux  disease)     History of recurrent UTIs     Hyperlipidemia     Hypertension     Osteoarthritis     Post laminectomy syndrome     Pre-diabetes     A1C 5.7% - 6.25.24    Pre-operative clearance     - ECG 12-LEAD - reviewed with Dr. Andre Cleared for surgery pending labs    Skin cancer     2 moles excised from back       Prior to Session Communication: Bedside nurse  Patient Position Received: Bed, 3 rail up, Alarm on  General Comment: Pt pleasant and agreeable to eval.  Home Living:  Home Living  Type of Home: House  Lives With: Spouse  Home Adaptive Equipment: Cane, Walker rolling or standard  Home Layout: One level  Home Access: Stairs to enter without rails  Entrance Stairs-Rails: None  Entrance Stairs-Number of Steps: 2  Prior Level of Function:  Prior Function Per Pt/Caregiver Report  Level of [place]: Independent with homemaking with ambulation, Independent with ADLs and functional transfers  Receives Help From:  ( will be available to assist)  ADL Assistance: Independent  Homemaking Assistance:  ( completes)  Ambulatory Assistance: Independent (using cane at baseline vs furniture walking)  Prior Function Comments: Drives. Denies any recent falls.  Precautions:  Precautions  LE Weight Bearing Status: Weight Bearing as Tolerated  Medical Precautions: Fall precautions  Post-Surgical Precautions: Right total knee precautions  Vital Signs:  Vital Signs  BP: 120/71    Objective   Pain:  Pain Assessment  Pain Assessment: 0-10  0-10 (Numeric) Pain Score: 8  Pain Type: Surgical pain  Pain Location:  (R knee)  Cognition:  Cognition  Overall Cognitive Status: Within Functional Limits  Orientation Level: Oriented X4    General Assessments:  Activity Tolerance  Endurance: Endurance does not limit participation in activity    Sensation  Light Touch: No apparent deficits    Postural Control  Postural Control: Within Functional Limits    Static Sitting Balance  Static Sitting-Balance Support: Feet supported,  Bilateral upper extremity supported  Static Sitting-Level of Assistance: Close supervision  Dynamic Sitting Balance  Dynamic Sitting-Balance Support: Feet supported, Bilateral upper extremity supported  Dynamic Sitting-Comments: Close supervision    Static Standing Balance  Static Standing-Balance Support: Bilateral upper extremity supported  Static Standing-Level of Assistance: Contact guard  Dynamic Standing Balance  Dynamic Standing-Balance Support: Bilateral upper extremity supported  Dynamic Standing-Comments: Contact guard  Functional Assessments:  Bed Mobility  Bed Mobility: Yes  Bed Mobility 1  Bed Mobility 1: Supine to sitting  Level of Assistance 1: Close supervision  Bed Mobility Comments 1: HOB elevated    Transfers  Transfer: Yes  Transfer 1  Technique 1: Sit to stand, Stand to sit  Transfer Device 1: Walker  Transfer Level of Assistance 1: Contact guard  Trials/Comments 1: Cues for hand placement.    Ambulation/Gait Training  Ambulation/Gait Training Performed: Yes  Ambulation/Gait Training 1  Surface 1: Level tile  Device 1: Rolling walker  Assistance 1: Contact guard  Comments/Distance (ft) 1: 40 ft. Pt ambulates within room with decreased juwan and step length. One instance of knee buckling observed however pt able to recover without assist from PT. Cues to increase R TKE and for walker placement.  Extremity/Trunk Assessments:  RUE   RUE : Within Functional Limits  LUE   LUE: Within Functional Limits  RLE   RLE :  (R knee ROM 5-75 degrees. Strength >3/5.)  LLE   LLE : Within Functional Limits  Treatments:  Therapeutic Exercise  Therapeutic Exercise Performed: Yes  Therapeutic Exercise Activity 1: Pt completes each of the following x15 reps to improve strength and endurance: ankle pumps, glute sets, quad sets, heel slides, SAQs, SLR, LAQs, seated knee flexion. Cues for proper form and technique.  Outcome Measures:  [facility] Basic Mobility  Turning from your back to your side while in a flat bed  without using bedrails: None  Moving from lying on your back to sitting on the side of a flat bed without using bedrails: A little  Moving to and from bed to chair (including a wheelchair): A little  Standing up from a chair using your arms (e.g. wheelchair or bedside chair): A little  To walk in hospital room: A little  Climbing 3-5 steps with railing: A lot  Basic Mobility - Total Score: 18    Encounter Problems       Encounter Problems (Active)       Mobility       STG - Patient will ambulate 150 ft mod I with a RW.        Start:  07/18/24    Expected End:  07/23/24            STG - Patient will ascend and descend 2 stairs using LRAD mod I.        Start:  07/18/24    Expected End:  07/23/24            Pt will tolerate 15 reps of each LE exercise independently in order to improve strength and endurance.        Start:  07/18/24    Expected End:  07/23/24            Pt will achieve 0-90 degrees R knee ROM.        Start:  07/18/24    Expected End:  07/23/24               PT Transfers       STG - Patient will perform bed mobility independently.        Start:  07/18/24    Expected End:  07/23/24            STG - Patient will transfer sit to and from stand mod I with a RW.        Start:  07/18/24    Expected End:  07/23/24            STG - Patient will perform car transfer mod I        Start:  07/18/24    Expected End:  07/23/24                   Education Documentation  No documentation found.  Education Comments  No comments found.

## 2024-07-19 ENCOUNTER — PHARMACY VISIT (OUTPATIENT)
Dept: PHARMACY | Facility: CLINIC | Age: 64
End: 2024-07-19
Payer: COMMERCIAL

## 2024-07-19 ENCOUNTER — DOCUMENTATION (OUTPATIENT)
Dept: HOME HEALTH SERVICES | Facility: HOME HEALTH | Age: 64
End: 2024-07-19
Payer: COMMERCIAL

## 2024-07-19 VITALS
WEIGHT: 177 LBS | BODY MASS INDEX: 27.78 KG/M2 | HEART RATE: 76 BPM | TEMPERATURE: 97.3 F | DIASTOLIC BLOOD PRESSURE: 82 MMHG | OXYGEN SATURATION: 95 % | SYSTOLIC BLOOD PRESSURE: 135 MMHG | RESPIRATION RATE: 16 BRPM | HEIGHT: 67 IN

## 2024-07-19 LAB
ANION GAP SERPL CALC-SCNC: 11 MMOL/L (ref 10–20)
BUN SERPL-MCNC: 9 MG/DL (ref 6–23)
CALCIUM SERPL-MCNC: 8.9 MG/DL (ref 8.6–10.3)
CHLORIDE SERPL-SCNC: 106 MMOL/L (ref 98–107)
CO2 SERPL-SCNC: 28 MMOL/L (ref 21–32)
CREAT SERPL-MCNC: 0.62 MG/DL (ref 0.5–1.05)
EGFRCR SERPLBLD CKD-EPI 2021: >90 ML/MIN/1.73M*2
ERYTHROCYTE [DISTWIDTH] IN BLOOD BY AUTOMATED COUNT: 12 % (ref 11.5–14.5)
GLUCOSE SERPL-MCNC: 94 MG/DL (ref 74–99)
HCT VFR BLD AUTO: 34.1 % (ref 36–46)
HGB BLD-MCNC: 11.2 G/DL (ref 12–16)
MCH RBC QN AUTO: 30 PG (ref 26–34)
MCHC RBC AUTO-ENTMCNC: 32.8 G/DL (ref 32–36)
MCV RBC AUTO: 91 FL (ref 80–100)
NRBC BLD-RTO: 0 /100 WBCS (ref 0–0)
PLATELET # BLD AUTO: 176 X10*3/UL (ref 150–450)
POTASSIUM SERPL-SCNC: 3.7 MMOL/L (ref 3.5–5.3)
RBC # BLD AUTO: 3.73 X10*6/UL (ref 4–5.2)
SODIUM SERPL-SCNC: 141 MMOL/L (ref 136–145)
WBC # BLD AUTO: 8.4 X10*3/UL (ref 4.4–11.3)

## 2024-07-19 PROCEDURE — 97110 THERAPEUTIC EXERCISES: CPT | Mod: GP,CQ

## 2024-07-19 PROCEDURE — RXMED WILLOW AMBULATORY MEDICATION CHARGE

## 2024-07-19 PROCEDURE — 80048 BASIC METABOLIC PNL TOTAL CA: CPT

## 2024-07-19 PROCEDURE — 36415 COLL VENOUS BLD VENIPUNCTURE: CPT

## 2024-07-19 PROCEDURE — 85027 COMPLETE CBC AUTOMATED: CPT

## 2024-07-19 PROCEDURE — 2500000001 HC RX 250 WO HCPCS SELF ADMINISTERED DRUGS (ALT 637 FOR MEDICARE OP)

## 2024-07-19 PROCEDURE — 97116 GAIT TRAINING THERAPY: CPT | Mod: GP,CQ

## 2024-07-19 PROCEDURE — 9420000001 HC RT PATIENT EDUCATION 5 MIN

## 2024-07-19 PROCEDURE — 2500000004 HC RX 250 GENERAL PHARMACY W/ HCPCS (ALT 636 FOR OP/ED): Mod: JZ

## 2024-07-19 PROCEDURE — 2500000001 HC RX 250 WO HCPCS SELF ADMINISTERED DRUGS (ALT 637 FOR MEDICARE OP): Performed by: STUDENT IN AN ORGANIZED HEALTH CARE EDUCATION/TRAINING PROGRAM

## 2024-07-19 RX ORDER — CYCLOBENZAPRINE HCL 5 MG
5 TABLET ORAL 3 TIMES DAILY
Status: DISCONTINUED | OUTPATIENT
Start: 2024-07-19 | End: 2024-07-19 | Stop reason: HOSPADM

## 2024-07-19 RX ORDER — CYCLOBENZAPRINE HCL 5 MG
5 TABLET ORAL 3 TIMES DAILY PRN
Qty: 30 TABLET | Refills: 0 | Status: SHIPPED | OUTPATIENT
Start: 2024-07-19

## 2024-07-19 ASSESSMENT — PAIN - FUNCTIONAL ASSESSMENT
PAIN_FUNCTIONAL_ASSESSMENT: 0-10

## 2024-07-19 ASSESSMENT — PAIN SCALES - GENERAL
PAINLEVEL_OUTOF10: 7
PAINLEVEL_OUTOF10: 10 - WORST POSSIBLE PAIN
PAINLEVEL_OUTOF10: 8
PAINLEVEL_OUTOF10: 8
PAINLEVEL_OUTOF10: 5 - MODERATE PAIN
PAINLEVEL_OUTOF10: 7
PAINLEVEL_OUTOF10: 9
PAINLEVEL_OUTOF10: 5 - MODERATE PAIN

## 2024-07-19 ASSESSMENT — PAIN SCALES - WONG BAKER
WONGBAKER_NUMERICALRESPONSE: HURTS EVEN MORE
WONGBAKER_NUMERICALRESPONSE: HURTS EVEN MORE
WONGBAKER_NUMERICALRESPONSE: HURTS WHOLE LOT
WONGBAKER_NUMERICALRESPONSE: HURTS WHOLE LOT
WONGBAKER_NUMERICALRESPONSE: HURTS WORST
WONGBAKER_NUMERICALRESPONSE: HURTS WORST

## 2024-07-19 ASSESSMENT — PAIN DESCRIPTION - LOCATION: LOCATION: KNEE

## 2024-07-19 ASSESSMENT — ACTIVITIES OF DAILY LIVING (ADL): LACK_OF_TRANSPORTATION: NO

## 2024-07-19 ASSESSMENT — COGNITIVE AND FUNCTIONAL STATUS - GENERAL
STANDING UP FROM CHAIR USING ARMS: A LITTLE
CLIMB 3 TO 5 STEPS WITH RAILING: A LITTLE
WALKING IN HOSPITAL ROOM: A LITTLE
MOVING TO AND FROM BED TO CHAIR: A LITTLE
MOBILITY SCORE: 20

## 2024-07-19 ASSESSMENT — PAIN DESCRIPTION - ORIENTATION: ORIENTATION: RIGHT

## 2024-07-19 NOTE — PROGRESS NOTES
07/19/24 0841   Current Planned Discharge Disposition   Current Planned Discharge Disposition [place]  ([facility] room 720)

## 2024-07-19 NOTE — PROGRESS NOTES
07/19/24 0842   Discharge Planning   Living Arrangements Spouse/significant other   Support Systems Spouse/significant other;Children   Assistance Needed Independent prior to admission   Type of Residence Private residence   Number of Stairs to Enter Residence 4   Number of Stairs Within Residence 0   Do you have animals or pets at home? Yes   Type of Animals or Pets dog   Who is requesting discharge planning? Provider   Home or Post Acute Services In home services   Type of Home Care Services Home PT   Expected Discharge Disposition  Services  ([facility] [place])   Does the patient need discharge transport arranged? Yes  (D[facility])   Financial Resource Strain   How hard is it for you to pay for the very basics like food, housing, medical care, and heating? Not hard   Housing Stability   In the last 12 months, was there a time when you were not able to pay the mortgage or rent on time? N   In the past 12 months, how many times have you moved where you were living? 0   At any time in the past 12 months, were you homeless or living in a shelter (including now)? N   Transportation Needs   In the past 12 months, has lack of transportation kept you from medical appointments or from getting medications? no   In the past 12 months, has lack of transportation kept you from meetings, work, or from getting things needed for daily living? No   Patient Choice   Provider Choice list and CMS website (https://medicare.gov/care-compare#search) for post-acute Quality and Resource Measure Data were provided and reviewed with: Patient   Patient / Family choosing to utilize agency / facility established prior to hospitalization Yes     Met with patient at the bedside.  PLAN: PT  DISP: [facility] room 720  ADOD: today  BARRIER: PT, meds to beds  Secure chat to [facility] [place] to inform of discharge destination.  Nery Rodriguez RN

## 2024-07-19 NOTE — DISCHARGE SUMMARY
Discharge Diagnosis  Primary osteoarthritis of right knee    Issues Requiring Follow-Up  S/P right knee replacement    Test Results Pending At Discharge  Pending Labs       No current pending labs.            Hospital Course   Briefly, the patient is a 64 year-old female with past Hx of osteoarthritis of right knee.  Pt is now S/P right total knee replacement.       HOSPITAL COURSE: The patient underwent right TKA on 7/18/24 which patient tolerated well and remained stable in the postoperative period. Patient was ordered oral and intravenous narcotics for pain control.  Post-op course complicated by muscle spasms which were addressed with a muscle relaxer. On day of discharge patient was tolerating a diet well, afebrile with stable vital signs and lab values, and had adequate pain control. The wound was clean and dry. Patient was instructed to follow up in the office within 2 weeks and was given prescription for oxycodone and tramadol for pain. Aspirin 81 mg twice daily was prescribed and compression stockings were provided for DVT prophylaxis. Colace was prescribed as needed for constipation. Home PT/ OT was arranged prior to discharge.      Pertinent Physical Exam At Time of Discharge  PE:  Constitutional: A&Ox3, calm and cooperative, NAD  Eyes: EOMI, clear sclera   Cardiovascular: Normal rate and regular rhythm. No murmurs  Respiratory/Thorax: CTAB, on RA  Genitourinary: Voiding independently   Musculoskeletal: right knee dressing CDI w/o surrounding erythema or drainage. fires EHL/FHL/TA/TP/EDL/FDL. SILT in SP/DP/T distribution. 2+ DP/PT, <2 CRT. Compartments soft, compressible.  Extremities: No peripheral edema  Neurological: A&Ox3, No focal deficits   Psychological: Appropriate mood and behavior        Home Medications     Medication List      START taking these medications     acetaminophen 500 mg tablet; Commonly known as: Tylenol; Take 2 tablets   (1,000 mg) by mouth every 8 hours for 20 days.   aspirin 81  mg chewable tablet; Chew 1 tablet (81 mg) 2 times a day.   cefadroxil 500 mg capsule; Commonly known as: Duricef; Take 1 capsule   (500 mg) by mouth 2 times a day for 5 days.   cyclobenzaprine 5 mg tablet; Commonly known as: Flexeril; Take 1 tablet   (5 mg) by mouth 3 times a day as needed for muscle spasms.   docusate sodium 100 mg capsule; Commonly known as: Colace; Take 1   capsule (100 mg) by mouth 2 times a day for 15 days.   ondansetron 4 mg tablet; Commonly known as: Zofran; Take 1 tablet (4 mg)   by mouth every 8 hours if needed for nausea or vomiting.   oxyCODONE 5 mg immediate release tablet; Commonly known as: Roxicodone;   Take 2-3 tablets (10-15 mg) by mouth every 6 hours if needed for severe   pain (7 - 10) for up to 7 days.   sennosides 8.6 mg tablet; Commonly known as: Senokot; Take 1 tablet (8.6   mg) by mouth once daily for 15 days.   traMADol 50 mg tablet; Commonly known as: Ultram; Take 1-2 tablets   ( mg) by mouth every 6 hours if needed for severe pain (7 - 10) for   up to 7 days.     CHANGE how you take these medications     pantoprazole 40 mg EC tablet; Commonly known as: ProtoNix; Take 1 tablet   (40 mg) by mouth once daily in the morning. Take before meals. Do not   crush, chew, or split.; What changed: when to take this, additional   instructions     CONTINUE taking these medications     amitriptyline 25 mg tablet; Commonly known as: Elavil   escitalopram 20 mg tablet; Commonly known as: Lexapro   famotidine 20 mg tablet; Commonly known as: Pepcid   losartan 100 mg tablet; Commonly known as: Cozaar   methocarbamol 750 mg tablet; Commonly known as: Robaxin   multivitamin tablet   rosuvastatin 10 mg tablet; Commonly known as: Crestor     STOP taking these medications     chlorhexidine 0.12 % solution; Commonly known as: Peridex   hydrOXYzine pamoate 25 mg capsule; Commonly known as: Vistaril   ibuprofen 200 mg tablet   naproxen 250 mg tablet; Commonly known as: Naprosyn    oxyCODONE-acetaminophen  mg tablet; Commonly known as: Percocet   PARoxetine 40 mg tablet; Commonly known as: Paxil       Outpatient Follow-Up  Future Appointments   Date Time Provider Department [place]   7/23/2024  7:45 AM Randolph Dutton MD [id number] [facility]   8/22/2024 12:00 AM RAD EXTERNAL FILM E[facility]Ex[facility] None       Ivania Olsen PA-C

## 2024-07-19 NOTE — NURSING NOTE
Interdisciplinary team present: NP, PT, NM, CC, SW, Orthopedic Coordinator, and bedside RN.  Pain - high pain levels  Nausea - none  Discharge barrier - n/a  Discharge plan - D[facility] with [facility]  Discharge date/time - today

## 2024-07-19 NOTE — CARE PLAN
The patient's goals for the shift include      The clinical goals for the shift include maintain pain control, increase ambulation this shift  Problem: Skin  Goal: Decreased wound size/increased tissue granulation at next dressing change  7/19/2024 1033 by Jerad Velazquez RN  Outcome: Adequate for Discharge  7/19/2024 1032 by Jerad Velazquez RN  Outcome: Progressing  Goal: Participates in plan/prevention/treatment measures  7/19/2024 1033 by Jerad Velazquez RN  Outcome: Adequate for Discharge  7/19/2024 1032 by Jerad Velazquez RN  Outcome: Progressing  Goal: Prevent/manage excess moisture  7/19/2024 1033 by Jerad Velazquez RN  Outcome: Adequate for Discharge  7/19/2024 1032 by Jerad Velazquez RN  Outcome: Progressing  Goal: Prevent/minimize sheer/friction injuries  7/19/2024 1033 by Jerad Velazquez RN  Outcome: Adequate for Discharge  7/19/2024 1032 by Jerad Velazquez RN  Outcome: Progressing  Goal: Promote/optimize nutrition  7/19/2024 1033 by Jerad Velazquez RN  Outcome: Adequate for Discharge  7/19/2024 1032 by Jerad Velazquez RN  Outcome: Progressing  Goal: Promote skin healing  7/19/2024 1033 by Jerad Velazquez RN  Outcome: Adequate for Discharge  7/19/2024 1032 by Jerad Velazquez RN  Outcome: Progressing     Problem: Pain  Goal: Takes deep breaths with improved pain control throughout the shift  7/19/2024 1033 by Jerad Velazquez RN  Outcome: Adequate for Discharge  7/19/2024 1032 by Jerad Velazquez RN  Outcome: Progressing  Goal: Turns in bed with improved pain control throughout the shift  7/19/2024 1033 by Jerad Velazquez RN  Outcome: Adequate for Discharge  7/19/2024 1032 by Benisha N Nickelberry-Griffith, RN  Outcome: Progressing  Goal: Walks with improved pain control throughout the shift  7/19/2024  1033 by Jerad Velazquez RN  Outcome: Adequate for Discharge  7/19/2024 1032 by Jerad Velazquez RN  Outcome: Progressing  Goal: Performs ADL's with improved pain control throughout shift  7/19/2024 1033 by Jerad Velazquez RN  Outcome: Adequate for Discharge  7/19/2024 1032 by Jerad Velazquez RN  Outcome: Progressing  Goal: Participates in PT with improved pain control throughout the shift  7/19/2024 1033 by Jerad Velazquez RN  Outcome: Adequate for Discharge  7/19/2024 1032 by Jerad Velazquez RN  Outcome: Progressing  Goal: Free from opioid side effects throughout the shift  7/19/2024 1033 by Jerad Velazquez RN  Outcome: Adequate for Discharge  7/19/2024 1032 by Jerad Velazquez RN  Outcome: Progressing  Goal: Free from acute confusion related to pain meds throughout the shift  7/19/2024 1033 by Jerad Velazquez RN  Outcome: Adequate for Discharge  7/19/2024 1032 by Jerad Velazquez RN  Outcome: Progressing     Problem: Pain - Adult  Goal: Verbalizes/displays adequate comfort level or baseline comfort level  7/19/2024 1033 by Jerad Velazquez RN  Outcome: Adequate for Discharge  7/19/2024 1032 by Jerad Velazquez RN  Outcome: Progressing     Problem: Safety - Adult  Goal: Free from fall injury  7/19/2024 1033 by Jerad Velazquez RN  Outcome: Adequate for Discharge  7/19/2024 1032 by Jerad Velazquez RN  Outcome: Progressing     Problem: Discharge Planning  Goal: Discharge to home or other facility with appropriate resources  7/19/2024 1033 by Jerad Velazquez RN  Outcome: Adequate for Discharge  7/19/2024 1032 by Jerad Velazquez RN  Outcome: Progressing     Problem: Chronic Conditions and Co-morbidities  Goal: Patient's chronic conditions and co-morbidity symptoms are monitored and maintained or  improved  7/19/2024 1033 by Jerad Velazquez RN  Outcome: Adequate for Discharge  7/19/2024 1032 by Jerad Velazquez RN  Outcome: Progressing

## 2024-07-19 NOTE — PROGRESS NOTES
Physical Therapy    Physical Therapy Treatment    Patient Name: Su Ortiz  MRN: [id number]  Today's Date: 7/19/2024  Time Calculation  Start Time: 0916  Stop Time: 0955  Time Calculation (min): 39 min    Assessment/Plan   PT Assessment  End of Session Communication: Bedside nurse  Assessment Comment: able to complete stair training  End of Session Patient Position: Alarm on  PT Plan  Inpatient/Swing Bed or Outpatient: Inpatient  PT Plan  Treatment/Interventions: Bed mobility, Transfer training, Gait training, Stair training  PT Plan: Ongoing PT  PT Frequency: BID  PT Discharge Recommendations: Low intensity level of continued care  PT Recommended Transfer Status: Assist x1  PT - OK to Discharge: Yes (per POC)      General Visit Information:   PT  Visit  PT Received On: 07/19/24  General  Reason for Referral: R TKA  Referred By: Mame Kaur PA-C  Prior to Session Communication: Bedside nurse  Patient Position Received: Bed, 3 rail up, Alarm on  Preferred Learning Style: auditory  General Comment: pt agreeable to tx    Subjective   Precautions:  Precautions  LE Weight Bearing Status: Weight Bearing as Tolerated  Medical Precautions: Fall precautions  Post-Surgical Precautions: Right total knee precautions  Vital Signs:       Objective   Pain:  Pain Assessment  0-10 (Numeric) Pain Score: 8 (rating does not match body langauge)  Pain Interventions: Medication (See MAR)  Cognition:  Cognition  Overall Cognitive Status: Within Functional Limits  Coordination:     Postural Control:     Extremity/Trunk Assessments:    Activity Tolerance:     Treatments:  Therapeutic Exercise  Therapeutic Exercise Performed: Yes  Therapeutic Exercise Activity 1: pt performed supine ther ex x15 : AP, QS, GS, SAQ, SLR, seated Knee flexion. vc/tc req         Bed Mobility  Bed Mobility: Yes  Bed Mobility 1  Bed Mobility 1: Supine to sitting, Sitting to supine  Level of Assistance 1: Independent  Bed Mobility Comments 1: HOB  flat    Ambulation/Gait Training  Ambulation/Gait Training Performed: Yes  Ambulation/Gait Training 1  Surface 1: Level tile  Device 1: Rolling walker  Gait Support Devices: Gait belt  Assistance 1: Close supervision  Comments/Distance (ft) 1: 250x1, 70x1 (pt performed with step through gait pattern.  VC for forward gaze and to stay inside RW.  no overt LOB noed. slow and steady throughout.)  Transfer 1  Technique 1: Sit to stand, Stand to sit  Transfer Device 1: Walker  Transfer Level of Assistance 1: Close supervision  Trials/Comments 1: vc/tc for hand placment on solid sitting surface and not RW.    Stairs  Stairs: Yes  Stairs  Comment/Number of Steps 1: pt performed 4 steps with 1 rail and SPC. VC for AD placement and BLE sequence. 2nd trial with HHA and SPC. CGA.  no overt LOB on either trial    Outcome Measures:  [facility] Basic Mobility  Turning from your back to your side while in a flat bed without using bedrails: None  Moving from lying on your back to sitting on the side of a flat bed without using bedrails: None  Moving to and from bed to chair (including a wheelchair): A little  Standing up from a chair using your arms (e.g. wheelchair or bedside chair): A little  To walk in hospital room: A little  Climbing 3-5 steps with railing: A little  Basic Mobility - Total Score: 20    Education Documentation  Home Exercise Program, taught by Avery Johnson PTA at 7/19/2024 10:59 AM.  Learner: Patient  Readiness: Acceptance  Method: Explanation  Response: Verbalizes Understanding    Mobility Training, taught by Avery Johnson PTA at 7/19/2024 10:59 AM.  Learner: Patient  Readiness: Acceptance  Method: Explanation  Response: Verbalizes Understanding    Education Comments  No comments found.        OP EDUCATION:  Outpatient Education  Education Comment: Educated pt on surgical precautions as well as how to complete a car trasnfer.  pt stated understanding    Encounter Problems       Encounter Problems (Active)        Mobility       STG - Patient will ambulate 150 ft mod I with a RW.  (Progressing)       Start:  07/18/24    Expected End:  07/23/24            STG - Patient will ascend and descend 2 stairs using LRAD mod I.  (Progressing)       Start:  07/18/24    Expected End:  07/23/24            Pt will tolerate 15 reps of each LE exercise independently in order to improve strength and endurance.  (Progressing)       Start:  07/18/24    Expected End:  07/23/24            Pt will achieve 0-90 degrees R knee ROM.  (Progressing)       Start:  07/18/24    Expected End:  07/23/24               PT Transfers       STG - Patient will perform bed mobility independently.  (Met)       Start:  07/18/24    Expected End:  07/23/24    Resolved:  07/19/24         STG - Patient will transfer sit to and from stand mod I with a RW.  (Progressing)       Start:  07/18/24    Expected End:  07/23/24            STG - Patient will perform car transfer mod I  (Progressing)       Start:  07/18/24    Expected End:  07/23/24                  Encounter Problems (Resolved)       Pain - Adult

## 2024-07-19 NOTE — CARE PLAN
The patient's goals for the shift include  pain control    The clinical goals for the shift include no falls and pain control

## 2024-07-19 NOTE — HH CARE COORDINATION
Home Care received a Referral for Physical Therapy. We have processed the referral for a Start of Care on 7/20/24.     If you have any questions or concerns, please feel free to contact us at [phone]. Follow the prompts, enter your five digit zip code, and you will be directed to your care team on [facility].

## 2024-07-19 NOTE — CARE PLAN
The patient's goals for the shift include      The clinical goals for the shift include maintain pain control, increase ambulation this shift  Problem: Skin  Goal: Decreased wound size/increased tissue granulation at next dressing change  Outcome: Progressing  Goal: Participates in plan/prevention/treatment measures  Outcome: Progressing  Goal: Prevent/manage excess moisture  Outcome: Progressing  Goal: Prevent/minimize sheer/friction injuries  Outcome: Progressing  Goal: Promote/optimize nutrition  Outcome: Progressing  Goal: Promote skin healing  Outcome: Progressing     Problem: Pain  Goal: Takes deep breaths with improved pain control throughout the shift  Outcome: Progressing  Goal: Turns in bed with improved pain control throughout the shift  Outcome: Progressing  Goal: Walks with improved pain control throughout the shift  Outcome: Progressing  Goal: Performs ADL's with improved pain control throughout shift  Outcome: Progressing  Goal: Participates in PT with improved pain control throughout the shift  Outcome: Progressing  Goal: Free from opioid side effects throughout the shift  Outcome: Progressing  Goal: Free from acute confusion related to pain meds throughout the shift  Outcome: Progressing     Problem: Pain - Adult  Goal: Verbalizes/displays adequate comfort level or baseline comfort level  Outcome: Progressing     Problem: Safety - Adult  Goal: Free from fall injury  Outcome: Progressing     Problem: Discharge Planning  Goal: Discharge to home or other facility with appropriate resources  Outcome: Progressing     Problem: Chronic Conditions and Co-morbidities  Goal: Patient's chronic conditions and co-morbidity symptoms are monitored and maintained or improved  Outcome: Progressing

## 2024-07-19 NOTE — NURSING NOTE

## 2024-07-20 ENCOUNTER — HOME CARE VISIT (OUTPATIENT)
Dept: HOME HEALTH SERVICES | Facility: HOME HEALTH | Age: 64
End: 2024-07-20
Payer: COMMERCIAL

## 2024-07-20 VITALS
DIASTOLIC BLOOD PRESSURE: 90 MMHG | SYSTOLIC BLOOD PRESSURE: 130 MMHG | OXYGEN SATURATION: 93 % | HEART RATE: 88 BPM | TEMPERATURE: 98 F

## 2024-07-20 DIAGNOSIS — M17.11 PRIMARY OSTEOARTHRITIS OF RIGHT KNEE: Primary | ICD-10-CM

## 2024-07-20 PROCEDURE — G0151 HHCP-SERV OF PT,EA 15 MIN: HCPCS

## 2024-07-20 RX ORDER — HYDROMORPHONE HYDROCHLORIDE 4 MG/1
4 TABLET ORAL EVERY 6 HOURS PRN
Qty: 15 TABLET | Refills: 0 | Status: SHIPPED | OUTPATIENT
Start: 2024-07-20 | End: 2024-07-24

## 2024-07-20 SDOH — ECONOMIC STABILITY: HOUSING INSECURITY: HOME SAFETY: PT PROVIDED WITH UH HANDBOOK

## 2024-07-20 ASSESSMENT — ACTIVITIES OF DAILY LIVING (ADL)
AMBULATION ASSISTANCE ON FLAT SURFACES: 1
AMBULATION ASSISTANCE: STAND BY ASSIST
CURRENT_FUNCTION: STAND BY ASSIST
ENTERING_EXITING_HOME: STAND BY ASSIST
AMBULATION_DISTANCE/DURATION_TOLERATED: 125 FEET

## 2024-07-20 ASSESSMENT — ENCOUNTER SYMPTOMS
SUBJECTIVE PAIN PROGRESSION: WAXING AND WANING
HIGHEST PAIN SEVERITY IN PAST 24 HOURS: 9/10
APPETITE LEVEL: FAIR
LOWEST PAIN SEVERITY IN PAST 24 HOURS: 8/10
PAIN SEVERITY GOAL: 5/10
PAIN: 1
PERSON REPORTING PAIN: PATIENT

## 2024-07-20 ASSESSMENT — PAIN SCALES - PAIN ASSESSMENT IN ADVANCED DEMENTIA (PAINAD)
CONSOLABILITY: 0 - NO NEED TO CONSOLE.
FACIALEXPRESSION: 0 - SMILING OR INEXPRESSIVE.
NEGVOCALIZATION: 0
NEGVOCALIZATION: 0 - NONE.
BREATHING: 0
BODYLANGUAGE: 0 - RELAXED.
CONSOLABILITY: 0
BODYLANGUAGE: 0
FACIALEXPRESSION: 0
TOTALSCORE: 0

## 2024-07-20 NOTE — TELEPHONE ENCOUNTER
Su Ortiz contacted me for refill of her pain medications.  Specifically, the patient states that the muscle relaxant that we prescribed was ineffective.  I spoke with her last night.  At that juncture, I recommended that she use 10 mg of Flexeril as opposed to the 5 mg.  This was ineffective still.  I offered the patient the opportunity to transition to tizanidine.  She states that she became profoundly hypotensive when she trialed tizanidine in the past with her pain management provider.  Accordingly, I made the decision to move forward with changing her from oxycodone to Dilaudid.  She knows to not combine the 2 medications.  She will use 2 to 4 mg of Dilaudid as needed every 6 hours for severe pain.  Will see if this better controls her pain.    Due to the nature of the surgery and the necessary post-operative rehabilitation, the patient will require more than 30 morphine MEQ per day for 6 days. The patient occasionally patient rates his pain a 9 or 10 on the pain scale.  I have personally reviewed the OARRS report for this patient. This report is scanned into the electronic medical record. I have considered the risks of abuse, dependence, addiction and diversion.

## 2024-07-20 NOTE — HOME HEALTH
65 yo female post RTKA by Dr. Braden Cummings on [id number].  Pt is wbat and Pts postop dressing to be addressed by MD. Pt agreeable to 3 PT and 1 SN visit.

## 2024-07-21 ENCOUNTER — HOME CARE VISIT (OUTPATIENT)
Dept: HOME HEALTH SERVICES | Facility: HOME HEALTH | Age: 64
End: 2024-07-21
Payer: COMMERCIAL

## 2024-07-21 VITALS
HEART RATE: 90 BPM | TEMPERATURE: 97.9 F | SYSTOLIC BLOOD PRESSURE: 130 MMHG | OXYGEN SATURATION: 93 % | DIASTOLIC BLOOD PRESSURE: 82 MMHG

## 2024-07-21 PROCEDURE — G0299 HHS/HOSPICE OF RN EA 15 MIN: HCPCS

## 2024-07-21 PROCEDURE — G0151 HHCP-SERV OF PT,EA 15 MIN: HCPCS

## 2024-07-21 SDOH — ECONOMIC STABILITY: FOOD INSECURITY: MEALS PER DAY: 2

## 2024-07-21 ASSESSMENT — ENCOUNTER SYMPTOMS
SUBJECTIVE PAIN PROGRESSION: UNCHANGED
PERSON REPORTING PAIN: PATIENT
LOWEST PAIN SEVERITY IN PAST 24 HOURS: 0/10
HIGHEST PAIN SEVERITY IN PAST 24 HOURS: 0/10
MUSCLE WEAKNESS: 1
PAIN SEVERITY GOAL: 0/10
PAIN: 1
PAIN LOCATION: RIGHT LEG
LAST BOWEL MOVEMENT: 67039
APPETITE LEVEL: FAIR
CHANGE IN APPETITE: UNCHANGED

## 2024-07-21 ASSESSMENT — ACTIVITIES OF DAILY LIVING (ADL)
AMBULATION ASSISTANCE: ONE PERSON
CURRENT_FUNCTION: ONE PERSON

## 2024-07-21 NOTE — HOME HEALTH
Pt visited for PT to address rom, strengthening and gait.  Dilaudid added to meds.  R  knee aarom after stretching 0 to 84 degrees Limited by muscles spasms.

## 2024-07-22 ENCOUNTER — TELEPHONE (OUTPATIENT)
Dept: ORTHOPEDIC SURGERY | Facility: HOSPITAL | Age: 64
End: 2024-07-22
Payer: COMMERCIAL

## 2024-07-22 ENCOUNTER — HOME CARE VISIT (OUTPATIENT)
Dept: HOME HEALTH SERVICES | Facility: HOME HEALTH | Age: 64
End: 2024-07-22
Payer: COMMERCIAL

## 2024-07-22 VITALS — SYSTOLIC BLOOD PRESSURE: 124 MMHG | DIASTOLIC BLOOD PRESSURE: 82 MMHG | HEART RATE: 82 BPM | OXYGEN SATURATION: 94 %

## 2024-07-22 PROCEDURE — G0151 HHCP-SERV OF PT,EA 15 MIN: HCPCS

## 2024-07-22 ASSESSMENT — ENCOUNTER SYMPTOMS
PAIN: 1
CONSTIPATION: 1
PAIN LOCATION: RIGHT KNEE
PAIN LOCATION - PAIN SEVERITY: 9/10
PERSON REPORTING PAIN: PATIENT
PAIN LOCATION - EXACERBATING FACTORS: ROM
PAIN LOCATION - PAIN FREQUENCY: CONSTANT
PAIN LOCATION - PAIN QUALITY: ACHING, THROBBING

## 2024-07-22 ASSESSMENT — ACTIVITIES OF DAILY LIVING (ADL)
AMBULATION ASSISTANCE ON FLAT SURFACES: 1
AMBULATION_DISTANCE/DURATION_TOLERATED: 200

## 2024-07-22 NOTE — TELEPHONE ENCOUNTER
Spoke with patient on the phone to follow up after hospital discharge. Patient is doing well following joint replacement surgery. Patient reports wearing Tigre Hose, managing pain and swelling, using Miralax to prevent constipation. Reports that home care has been out. Reminded of follow up visit. Advised to call their nurse navigator during business hours, after business hours advised to call  their surgeon's office for urgent matters. Denies any further questions or concerns at this time.

## 2024-07-23 ENCOUNTER — PHARMACY VISIT (OUTPATIENT)
Dept: PHARMACY | Facility: CLINIC | Age: 64
End: 2024-07-23
Payer: COMMERCIAL

## 2024-07-23 ENCOUNTER — OFFICE VISIT (OUTPATIENT)
Dept: ORTHOPEDIC SURGERY | Facility: CLINIC | Age: 64
End: 2024-07-23
Payer: COMMERCIAL

## 2024-07-23 VITALS
DIASTOLIC BLOOD PRESSURE: 60 MMHG | HEART RATE: 87 BPM | RESPIRATION RATE: 19 BRPM | TEMPERATURE: 98.6 F | OXYGEN SATURATION: 99 % | SYSTOLIC BLOOD PRESSURE: 120 MMHG

## 2024-07-23 DIAGNOSIS — Z47.1 AFTERCARE FOLLOWING RIGHT KNEE JOINT REPLACEMENT SURGERY: Primary | ICD-10-CM

## 2024-07-23 DIAGNOSIS — Z96.651 AFTERCARE FOLLOWING RIGHT KNEE JOINT REPLACEMENT SURGERY: Primary | ICD-10-CM

## 2024-07-23 PROCEDURE — 1036F TOBACCO NON-USER: CPT | Performed by: STUDENT IN AN ORGANIZED HEALTH CARE EDUCATION/TRAINING PROGRAM

## 2024-07-23 PROCEDURE — RXMED WILLOW AMBULATORY MEDICATION CHARGE

## 2024-07-23 PROCEDURE — 99211 OFF/OP EST MAY X REQ PHY/QHP: CPT | Performed by: STUDENT IN AN ORGANIZED HEALTH CARE EDUCATION/TRAINING PROGRAM

## 2024-07-23 RX ORDER — TRAMADOL HYDROCHLORIDE 50 MG/1
50-100 TABLET ORAL EVERY 6 HOURS PRN
Qty: 40 TABLET | Refills: 0 | Status: SHIPPED | OUTPATIENT
Start: 2024-07-23 | End: 2024-07-28

## 2024-07-23 RX ORDER — CYCLOBENZAPRINE HCL 5 MG
5 TABLET ORAL 3 TIMES DAILY PRN
Qty: 30 TABLET | Refills: 0 | Status: SHIPPED | OUTPATIENT
Start: 2024-07-23 | End: 2024-08-02

## 2024-07-23 RX ORDER — HYDROMORPHONE HYDROCHLORIDE 2 MG/1
2-4 TABLET ORAL EVERY 6 HOURS PRN
Qty: 40 TABLET | Refills: 0 | Status: SHIPPED | OUTPATIENT
Start: 2024-07-23 | End: 2024-07-28

## 2024-07-23 ASSESSMENT — ENCOUNTER SYMPTOMS
PAIN LOCATION - PAIN FREQUENCY: INTERMITTENT
PAIN LOCATION - PAIN QUALITY: ACHY
PAIN LOCATION - PAIN SEVERITY: 0/10

## 2024-07-23 NOTE — PROGRESS NOTES
Diagnosis: End stage osteoarthritis Right Knee  Procedure Performed: Right Total Knee Arthroplasty   Date of Surgery: July 18, 2024    Subjective:  Su Ortiz is here for regularly scheduled follow-up after the above procedure.  Over the weekend, she had poorly controlled pain.  We changed her from oxycodone to Dilaudid.  This seemed to have helped her pain.  The patient has no specific complaints other than occasional discomfort. The patient is using Tylenol, Dilaudid, Flexeril and tramadol for pain control.  The patient  has been compliant with GABRIELLA compression stocking as prescribed. They have been working with home physical therapy and have not progressed to outpatient PT. The patient is ambulatory with a walker. The patient denies: fevers, chills, incisional drainage, joint instability, chest or calf pain, shortness of breath, and night sweats. The patient has been compliant with their prescribed aspirin for VTE prophylaxis. There are no complaints of numbness or tingling in the operative extremity.     Physical Examination:   General: Patient is alert and oriented x 3 and appears comfortable.  Gait: Patient ambulates with slight antalgic gait.  Wound: Incision is well healed over the right knee. There is mild warmth and effusion about the knee, both consistent with the normal post-operative healing. There is no erythema, incisional drainage, fluctuance, or suture abscess.   Right Knee: ROM 10-85  Stable to varus and valgus stress at 0 and 30 degrees.   Patella tracks midline  Expected post operative swelling and tenderness   Calf: No swelling or tenderness  Able to dorsi-flex and plantar-flex the ankle with appropriate strength. Able to wiggle all toes.   The foot is warm and well perfused with palpable pulses.   Sensation is intact to light touch.     Radiographs: None today    Assessment and Plan: uS Ortiz is progressing well approximately 1 week s/p right total knee arthroplasty. I am satisfied  with the patient's recovery to this point.     1. A thorough discussion was had with the patient concerning the postoperative course and the patient is in agreement with the plan.  2. Continued physical therapy with gait training to improve strength and stamina. Progress off support as tolerated.   3.  The patient was on Percocet 10/325 4 times daily prior to surgery.  Pain control will be challenging to achieve for this patient.  Will continue her on Dilaudid, tramadol and Flexeril regimen for now and then wean off as tolerated.  Due to the nature of the surgery and the necessary post-operative rehabilitation, the patient will require more than 30 morphine MEQ per day for 6 days. The patient occasionally patient rates his pain a 9 or 10 on the pain scale.  I have personally reviewed the OARRS report for this patient. This report is scanned into the electronic medical record. I have considered the risks of abuse, dependence, addiction and diversion.  4. Reviewed the need for prophylactic antibiotics prior to any dental or other invasive procedures.  5. No swimming or tub bathing until 3 months post op  6.  Cleared to travel.  X-rays at 6 weeks per protocol      ** This office note was dictated using Dragon voice to text software and was not proofread for spelling or grammatical errors *

## 2024-07-30 ENCOUNTER — TELEPHONE (OUTPATIENT)
Dept: ORTHOPEDIC SURGERY | Facility: HOSPITAL | Age: 64
End: 2024-07-30
Payer: COMMERCIAL

## 2024-07-30 NOTE — TELEPHONE ENCOUNTER
Spoke with patient on the phone to follow up after hospital discharge. Patient is doing well following joint replacement surgery. Patient reports wearing Tigre Hose, managing pain and swelling, using Miralax to prevent constipation. Reports that home care has been out. Reminded of follow up visit. Advised to call their nurse navigator during business hours, after business hours advised to call  their surgeon's office for urgent matters. Denies any further questions or concerns at this time. Flexing to 90 and doing well with straightening. She has a PT appointment tomorrow and is instructed to keep me updated on her progress.

## 2024-08-02 DIAGNOSIS — Z47.1 AFTERCARE FOLLOWING RIGHT KNEE JOINT REPLACEMENT SURGERY: Primary | ICD-10-CM

## 2024-08-02 DIAGNOSIS — Z96.651 AFTERCARE FOLLOWING RIGHT KNEE JOINT REPLACEMENT SURGERY: Primary | ICD-10-CM

## 2024-08-02 RX ORDER — HYDROMORPHONE HYDROCHLORIDE 2 MG/1
2-4 TABLET ORAL EVERY 4 HOURS PRN
Qty: 40 TABLET | Refills: 0 | Status: SHIPPED | OUTPATIENT
Start: 2024-08-02 | End: 2024-08-07

## 2024-08-02 RX ORDER — TRAMADOL HYDROCHLORIDE 50 MG/1
50-100 TABLET ORAL EVERY 6 HOURS PRN
Qty: 40 TABLET | Refills: 0 | Status: SHIPPED | OUTPATIENT
Start: 2024-08-02 | End: 2024-08-09

## 2024-08-02 NOTE — TELEPHONE ENCOUNTER
Patient contacted my office for refill of pain medications.  She has been attending well physical therapy.  I advised her that the Dilaudid is quite potent and that we should try to wean off of that.  The patient does not feel that she can progress with her range of motion activities without the Dilaudid.  A refill of Dilaudid and tramadol was submitted.  She knows to alternate the medications.  We will attempt to make this her last Dilaudid refill and thereafter transition back to Oxy or Vicodin.    Due to the nature of the surgery and the necessary post-operative rehabilitation, the patient will require more than 30 morphine MEQ per day for 6 days. The patient occasionally patient rates his pain a 9 or 10 on the pain scale.  I have personally reviewed the OARRS report for this patient. This report is scanned into the electronic medical record. I have considered the risks of abuse, dependence, addiction and diversion.

## 2024-08-22 ENCOUNTER — HOSPITAL ENCOUNTER (OUTPATIENT)
Dept: RADIOLOGY | Facility: EXTERNAL LOCATION | Age: 64
Discharge: HOME | End: 2024-08-22

## 2024-08-22 DIAGNOSIS — Z96.651 HX OF TOTAL KNEE ARTHROPLASTY, RIGHT: ICD-10-CM

## 2024-10-01 ENCOUNTER — HOSPITAL ENCOUNTER (OUTPATIENT)
Dept: RADIOLOGY | Facility: CLINIC | Age: 64
Discharge: HOME | End: 2024-10-01
Payer: COMMERCIAL

## 2024-10-01 ENCOUNTER — OFFICE VISIT (OUTPATIENT)
Dept: ORTHOPEDIC SURGERY | Facility: CLINIC | Age: 64
End: 2024-10-01
Payer: COMMERCIAL

## 2024-10-01 DIAGNOSIS — Z96.651 S/P TOTAL KNEE ARTHROPLASTY, RIGHT: ICD-10-CM

## 2024-10-01 PROCEDURE — 73562 X-RAY EXAM OF KNEE 3: CPT | Mod: RIGHT SIDE | Performed by: RADIOLOGY

## 2024-10-01 PROCEDURE — 99211 OFF/OP EST MAY X REQ PHY/QHP: CPT | Performed by: STUDENT IN AN ORGANIZED HEALTH CARE EDUCATION/TRAINING PROGRAM

## 2024-10-01 PROCEDURE — 73562 X-RAY EXAM OF KNEE 3: CPT | Mod: RT

## 2024-10-01 PROCEDURE — 1036F TOBACCO NON-USER: CPT | Performed by: STUDENT IN AN ORGANIZED HEALTH CARE EDUCATION/TRAINING PROGRAM

## 2024-10-01 NOTE — PROGRESS NOTES
Diagnosis: End stage osteoarthritis Right Knee  Procedure Performed: Right Total Knee Arthroplasty   Date of Surgery: July 18, 2024    Chief Complaint: Clicking following right total knee arthroplasty.    Subjective:  Su Ortiz is here for unscheduled follow-up after the above procedure.  The patient complains of clicking and subluxation of the joint with weightbearing.  When she applies a lateral force to the knee with the knee planted, she will get the knee to sublux a few degrees laterally.  She was concerned about this.  She has not fallen.  She still using a cane.  She has not been performing home exercises.  She has completed physical therapy.  She is not in pain.    Physical Examination:   General: Patient is alert and oriented x 3 and appears comfortable.  Gait: Mild antalgia.  She does not have an overt thrust with ambulation.  However, with her foot planted, she can demonstrate some lateral subluxation.  Wound: Incision is well healed over the right knee. There is no erythema, incisional drainage, fluctuance, or suture abscess.   Right Knee: ROM 0-120  Stable to varus and valgus stress at 0 and 30 degrees.  There is some laxity with varus stress at 0 and 30 degrees.   Patella tracks midline  Expected post operative swelling and tenderness   Calf: No swelling or tenderness  Able to dorsi-flex and plantar-flex the ankle with appropriate strength. Able to wiggle all toes.   The foot is warm and well perfused with palpable pulses.   Sensation is intact to light touch.     Radiographs: 3 views of the right knee were obtained and independent reviewed.  I discussed the results with the patient.  Components appear to be appropriately positioned.  There is some radiolucency along the medial aspect of the arthroplasty but no overt signs of loosening or failure.  Patella is tracking centrally.    Assessment and Plan: Su Ortiz is progressing well approximately 10 weeks s/p right total knee arthroplasty. I  am satisfied with the patient's recovery to this point.     With respect to the patient's laxity, I believe it is secondary to weakness of her quadriceps and hamstring muscles.  We discussed potential treatment options.  These included guided physical therapy versus a home exercise regimen.  The patient would prefer to exercise at home.  I reviewed several strengthening exercises targeting her quadriceps, hamstring and IT band.  I also encouraged the patient to purchase a bicycle and to begin bicycling.  I do not believe that the joint is unstable.  I do see no indication for revision arthroplasty at this juncture.  I do believe that the patient would benefit from continued strengthening.    1. A thorough discussion was had with the patient concerning the postoperative course and the patient is in agreement with the plan.  2. Continued physical therapy with gait training to improve strength and stamina. Progress off support as tolerated.   3.  Over-the-counter medications as needed for pain control  4. Reviewed the need for prophylactic antibiotics prior to any dental or other invasive procedures.  5. No swimming or tub bathing until 3 months post op  6.  Will plan for a phone call within 6 weeks to discuss the patient's progress.      ** This office note was dictated using Dragon voice to text software and was not proofread for spelling or grammatical errors *

## (undated) DEVICE — SUTURE, ETHIBOND, P2, V-37, 30 IN, GREEN

## (undated) DEVICE — GLOVE, SURGICAL, PROTEXIS PI BLUE W/NEUTHERA, 8.0, PF, LF

## (undated) DEVICE — BLADE, SAW, SAGITTAL, DUAL CUT, 18 X 90 X 1.27

## (undated) DEVICE — STRIP, SKIN CLOSURE, STERI STRIP, REINFORCED, 0.5 X 4 IN

## (undated) DEVICE — WOUND SYSTEM, DEBRIDEMENT & CLEANING, O.R DUOPAK

## (undated) DEVICE — SUTURE, STRATAFIX PDS PLUS, 1, OS-6, SYMMETRIC 60CM

## (undated) DEVICE — SYRINGE, 60 CC, LUER LOCK, MONOJECT, W/O CAP, LF

## (undated) DEVICE — SUTURE, CTD, VICRYL, 2-0, UND, BR, CT-2

## (undated) DEVICE — NEEDLE, SPINAL, QUINCKE, 18 G X 3.5 IN, PINK HUB

## (undated) DEVICE — ADHESIVE, SKIN, LIQUIBAND EXCEED

## (undated) DEVICE — SUTURE, MONOCRYL, 3-0, 27 IN, PS-2, UNDYED

## (undated) DEVICE — GLOVE, SURGICAL, PROTEXIS PI ORTHO, 8.0, PF, LF

## (undated) DEVICE — Device

## (undated) DEVICE — BLADE, SAW, RECIPROCATING, DOUBLE-SIDED, 70 X 12.5 X 1 MM, STAINLESS STEEL

## (undated) DEVICE — PAD, KNEE STABILIZER, REPLACEMENT KRAAY

## (undated) DEVICE — MARKER, SKIN, DUAL TIP INK W/9 LABEL AND REMOVABLE TIME OUT SLEEVE

## (undated) DEVICE — TOWEL, SURGICAL, NEURO, O/R, 16 X 26, BLUE, STERILE

## (undated) DEVICE — BAG, DECANTER

## (undated) DEVICE — GLOVE, SURGICAL, PROTEXIS PI MICRO, 8.0, PF, LF

## (undated) DEVICE — SUTURE, VICRYL, 0, 27 IN, CT-1, UNDYED